# Patient Record
Sex: MALE | Race: WHITE | NOT HISPANIC OR LATINO | Employment: FULL TIME | RURAL
[De-identification: names, ages, dates, MRNs, and addresses within clinical notes are randomized per-mention and may not be internally consistent; named-entity substitution may affect disease eponyms.]

---

## 2020-11-05 ENCOUNTER — HISTORICAL (OUTPATIENT)
Dept: ADMINISTRATIVE | Facility: HOSPITAL | Age: 40
End: 2020-11-05

## 2020-11-05 LAB — SARS-COV+SARS-COV-2 AG RESP QL IA.RAPID: NEGATIVE

## 2021-06-03 ENCOUNTER — OFFICE VISIT (OUTPATIENT)
Dept: FAMILY MEDICINE | Facility: CLINIC | Age: 41
End: 2021-06-03
Payer: COMMERCIAL

## 2021-06-03 VITALS
OXYGEN SATURATION: 97 % | TEMPERATURE: 98 F | RESPIRATION RATE: 18 BRPM | HEIGHT: 76 IN | DIASTOLIC BLOOD PRESSURE: 86 MMHG | SYSTOLIC BLOOD PRESSURE: 132 MMHG | HEART RATE: 92 BPM | BODY MASS INDEX: 35.68 KG/M2 | WEIGHT: 293 LBS

## 2021-06-03 DIAGNOSIS — I10 HYPERTENSION, UNSPECIFIED TYPE: ICD-10-CM

## 2021-06-03 DIAGNOSIS — R53.83 FATIGUE, UNSPECIFIED TYPE: ICD-10-CM

## 2021-06-03 DIAGNOSIS — M25.572 SINUS TARSI SYNDROME OF LEFT ANKLE: ICD-10-CM

## 2021-06-03 DIAGNOSIS — G62.9 POLYNEUROPATHY: Primary | ICD-10-CM

## 2021-06-03 LAB
ANION GAP SERPL CALCULATED.3IONS-SCNC: 10 MMOL/L (ref 7–16)
BASOPHILS # BLD AUTO: 0.07 K/UL (ref 0–0.2)
BASOPHILS NFR BLD AUTO: 0.8 % (ref 0–1)
BUN SERPL-MCNC: 16 MG/DL (ref 7–18)
BUN/CREAT SERPL: 18 (ref 6–20)
CALCIUM SERPL-MCNC: 9.4 MG/DL (ref 8.5–10.1)
CHLORIDE SERPL-SCNC: 105 MMOL/L (ref 98–107)
CO2 SERPL-SCNC: 28 MMOL/L (ref 21–32)
CREAT SERPL-MCNC: 0.91 MG/DL (ref 0.7–1.3)
DIFFERENTIAL METHOD BLD: ABNORMAL
EOSINOPHIL # BLD AUTO: 0.34 K/UL (ref 0–0.5)
EOSINOPHIL NFR BLD AUTO: 3.7 % (ref 1–4)
ERYTHROCYTE [DISTWIDTH] IN BLOOD BY AUTOMATED COUNT: 12.2 % (ref 11.5–14.5)
EST. AVERAGE GLUCOSE BLD GHB EST-MCNC: 87 MG/DL
GLUCOSE SERPL-MCNC: 88 MG/DL (ref 74–106)
HBA1C MFR BLD HPLC: 5.2 % (ref 4.5–6.6)
HCT VFR BLD AUTO: 49.7 % (ref 40–54)
HGB BLD-MCNC: 16.5 G/DL (ref 13.5–18)
IMM GRANULOCYTES # BLD AUTO: 0.03 K/UL (ref 0–0.04)
IMM GRANULOCYTES NFR BLD: 0.3 % (ref 0–0.4)
LYMPHOCYTES # BLD AUTO: 3.72 K/UL (ref 1–4.8)
LYMPHOCYTES NFR BLD AUTO: 40 % (ref 27–41)
MCH RBC QN AUTO: 31.4 PG (ref 27–31)
MCHC RBC AUTO-ENTMCNC: 33.2 G/DL (ref 32–36)
MCV RBC AUTO: 94.5 FL (ref 80–96)
MONOCYTES # BLD AUTO: 0.72 K/UL (ref 0–0.8)
MONOCYTES NFR BLD AUTO: 7.8 % (ref 2–6)
MPC BLD CALC-MCNC: 10.5 FL (ref 9.4–12.4)
NEUTROPHILS # BLD AUTO: 4.41 K/UL (ref 1.8–7.7)
NEUTROPHILS NFR BLD AUTO: 47.4 % (ref 53–65)
NRBC # BLD AUTO: 0 X10E3/UL
NRBC, AUTO (.00): 0 %
PLATELET # BLD AUTO: 356 K/UL (ref 150–400)
POTASSIUM SERPL-SCNC: 4.5 MMOL/L (ref 3.5–5.1)
RBC # BLD AUTO: 5.26 M/UL (ref 4.6–6.2)
SODIUM SERPL-SCNC: 138 MMOL/L (ref 136–145)
T3 SERPL-MCNC: 174 NG/DL (ref 60–181)
T4 SERPL-MCNC: 9.5 ΜG/DL (ref 4.5–12.1)
TSH SERPL DL<=0.005 MIU/L-ACNC: 0.93 UIU/ML (ref 0.36–3.74)
WBC # BLD AUTO: 9.29 K/UL (ref 4.5–11)

## 2021-06-03 PROCEDURE — 3008F PR BODY MASS INDEX (BMI) DOCUMENTED: ICD-10-PCS | Mod: ,,, | Performed by: NURSE PRACTITIONER

## 2021-06-03 PROCEDURE — 1125F AMNT PAIN NOTED PAIN PRSNT: CPT | Mod: ,,, | Performed by: NURSE PRACTITIONER

## 2021-06-03 PROCEDURE — 80048 BASIC METABOLIC PANEL: ICD-10-PCS | Mod: ,,, | Performed by: CLINICAL MEDICAL LABORATORY

## 2021-06-03 PROCEDURE — 84480 ASSAY TRIIODOTHYRONINE (T3): CPT | Mod: ,,, | Performed by: CLINICAL MEDICAL LABORATORY

## 2021-06-03 PROCEDURE — 85025 COMPLETE CBC W/AUTO DIFF WBC: CPT | Mod: ,,, | Performed by: CLINICAL MEDICAL LABORATORY

## 2021-06-03 PROCEDURE — 84443 TSH: ICD-10-PCS | Mod: ,,, | Performed by: CLINICAL MEDICAL LABORATORY

## 2021-06-03 PROCEDURE — 1125F PR PAIN SEVERITY QUANTIFIED, PAIN PRESENT: ICD-10-PCS | Mod: ,,, | Performed by: NURSE PRACTITIONER

## 2021-06-03 PROCEDURE — 3008F BODY MASS INDEX DOCD: CPT | Mod: ,,, | Performed by: NURSE PRACTITIONER

## 2021-06-03 PROCEDURE — 84436 ASSAY OF TOTAL THYROXINE: CPT | Mod: ,,, | Performed by: CLINICAL MEDICAL LABORATORY

## 2021-06-03 PROCEDURE — 84443 ASSAY THYROID STIM HORMONE: CPT | Mod: ,,, | Performed by: CLINICAL MEDICAL LABORATORY

## 2021-06-03 PROCEDURE — 84480 T3: ICD-10-PCS | Mod: ,,, | Performed by: CLINICAL MEDICAL LABORATORY

## 2021-06-03 PROCEDURE — 85025 CBC WITH DIFFERENTIAL: ICD-10-PCS | Mod: ,,, | Performed by: CLINICAL MEDICAL LABORATORY

## 2021-06-03 PROCEDURE — 80048 BASIC METABOLIC PNL TOTAL CA: CPT | Mod: ,,, | Performed by: CLINICAL MEDICAL LABORATORY

## 2021-06-03 PROCEDURE — 83036 HEMOGLOBIN GLYCOSYLATED A1C: CPT | Mod: ,,, | Performed by: CLINICAL MEDICAL LABORATORY

## 2021-06-03 PROCEDURE — 83036 HEMOGLOBIN A1C: ICD-10-PCS | Mod: ,,, | Performed by: CLINICAL MEDICAL LABORATORY

## 2021-06-03 PROCEDURE — 99214 OFFICE O/P EST MOD 30 MIN: CPT | Mod: ,,, | Performed by: NURSE PRACTITIONER

## 2021-06-03 PROCEDURE — 99214 PR OFFICE/OUTPT VISIT, EST, LEVL IV, 30-39 MIN: ICD-10-PCS | Mod: ,,, | Performed by: NURSE PRACTITIONER

## 2021-06-03 PROCEDURE — 84436 T4: ICD-10-PCS | Mod: ,,, | Performed by: CLINICAL MEDICAL LABORATORY

## 2021-06-03 RX ORDER — OMEPRAZOLE 40 MG/1
40 CAPSULE, DELAYED RELEASE ORAL DAILY
COMMUNITY
End: 2021-07-22 | Stop reason: SDUPTHER

## 2021-06-03 RX ORDER — LISINOPRIL 10 MG/1
1 TABLET ORAL DAILY
COMMUNITY
Start: 2021-04-30 | End: 2021-07-22 | Stop reason: SDUPTHER

## 2021-06-03 RX ORDER — GABAPENTIN 100 MG/1
100 CAPSULE ORAL 3 TIMES DAILY
Qty: 90 CAPSULE | Refills: 2 | Status: SHIPPED | OUTPATIENT
Start: 2021-06-03 | End: 2021-06-23 | Stop reason: DRUGHIGH

## 2021-06-03 RX ORDER — HYDROCODONE BITARTRATE AND ACETAMINOPHEN 7.5; 325 MG/1; MG/1
1 TABLET ORAL 2 TIMES DAILY PRN
COMMUNITY
Start: 2021-06-01

## 2021-06-03 RX ORDER — AMLODIPINE BESYLATE 5 MG/1
1 TABLET ORAL DAILY
COMMUNITY
Start: 2021-03-20 | End: 2021-06-14

## 2021-06-23 DIAGNOSIS — G62.9 POLYNEUROPATHY: Primary | ICD-10-CM

## 2021-06-23 RX ORDER — GABAPENTIN 300 MG/1
300 CAPSULE ORAL 2 TIMES DAILY
Qty: 60 CAPSULE | Refills: 0 | Status: SHIPPED | OUTPATIENT
Start: 2021-06-23 | End: 2021-07-22 | Stop reason: SDUPTHER

## 2021-07-22 ENCOUNTER — OFFICE VISIT (OUTPATIENT)
Dept: FAMILY MEDICINE | Facility: CLINIC | Age: 41
End: 2021-07-22
Payer: COMMERCIAL

## 2021-07-22 VITALS
OXYGEN SATURATION: 98 % | RESPIRATION RATE: 18 BRPM | DIASTOLIC BLOOD PRESSURE: 82 MMHG | SYSTOLIC BLOOD PRESSURE: 135 MMHG | TEMPERATURE: 99 F | HEIGHT: 75 IN | HEART RATE: 90 BPM | WEIGHT: 285 LBS | BODY MASS INDEX: 35.43 KG/M2

## 2021-07-22 DIAGNOSIS — G62.9 POLYNEUROPATHY: ICD-10-CM

## 2021-07-22 DIAGNOSIS — Z13.220 SCREENING, LIPID: ICD-10-CM

## 2021-07-22 DIAGNOSIS — K21.9 GASTROESOPHAGEAL REFLUX DISEASE, UNSPECIFIED WHETHER ESOPHAGITIS PRESENT: ICD-10-CM

## 2021-07-22 DIAGNOSIS — Z00.00 WELLNESS EXAMINATION: Primary | ICD-10-CM

## 2021-07-22 DIAGNOSIS — I10 HYPERTENSION, UNSPECIFIED TYPE: ICD-10-CM

## 2021-07-22 DIAGNOSIS — Z13.1 SCREENING FOR DIABETES MELLITUS: ICD-10-CM

## 2021-07-22 LAB — GLUCOSE SERPL-MCNC: 83 MG/DL (ref 74–106)

## 2021-07-22 PROCEDURE — 1125F AMNT PAIN NOTED PAIN PRSNT: CPT | Mod: ,,, | Performed by: NURSE PRACTITIONER

## 2021-07-22 PROCEDURE — 3008F BODY MASS INDEX DOCD: CPT | Mod: ,,, | Performed by: NURSE PRACTITIONER

## 2021-07-22 PROCEDURE — 82947 GLUCOSE, FASTING: ICD-10-PCS | Mod: ,,, | Performed by: CLINICAL MEDICAL LABORATORY

## 2021-07-22 PROCEDURE — 3008F PR BODY MASS INDEX (BMI) DOCUMENTED: ICD-10-PCS | Mod: ,,, | Performed by: NURSE PRACTITIONER

## 2021-07-22 PROCEDURE — 1125F PR PAIN SEVERITY QUANTIFIED, PAIN PRESENT: ICD-10-PCS | Mod: ,,, | Performed by: NURSE PRACTITIONER

## 2021-07-22 PROCEDURE — 82947 ASSAY GLUCOSE BLOOD QUANT: CPT | Mod: ,,, | Performed by: CLINICAL MEDICAL LABORATORY

## 2021-07-22 PROCEDURE — 99396 PREV VISIT EST AGE 40-64: CPT | Mod: ,,, | Performed by: NURSE PRACTITIONER

## 2021-07-22 PROCEDURE — 99396 PR PREVENTIVE VISIT,EST,40-64: ICD-10-PCS | Mod: ,,, | Performed by: NURSE PRACTITIONER

## 2021-07-22 RX ORDER — OMEPRAZOLE 40 MG/1
40 CAPSULE, DELAYED RELEASE ORAL DAILY
Qty: 90 CAPSULE | Refills: 1 | Status: SHIPPED | OUTPATIENT
Start: 2021-07-22 | End: 2021-11-04 | Stop reason: SDUPTHER

## 2021-07-22 RX ORDER — GABAPENTIN 300 MG/1
300 CAPSULE ORAL 2 TIMES DAILY
Qty: 180 CAPSULE | Refills: 1 | Status: SHIPPED | OUTPATIENT
Start: 2021-07-22 | End: 2021-11-04 | Stop reason: DRUGHIGH

## 2021-07-22 RX ORDER — LISINOPRIL 10 MG/1
10 TABLET ORAL DAILY
Qty: 90 TABLET | Refills: 1 | Status: SHIPPED | OUTPATIENT
Start: 2021-07-22 | End: 2021-11-04 | Stop reason: SDUPTHER

## 2021-07-22 RX ORDER — AMLODIPINE BESYLATE 5 MG/1
5 TABLET ORAL DAILY
Qty: 90 TABLET | Refills: 1 | Status: SHIPPED | OUTPATIENT
Start: 2021-07-22 | End: 2021-11-04 | Stop reason: SDUPTHER

## 2021-07-27 LAB
CHOLEST SERPL-MCNC: 177 MG/DL (ref 0–200)
CHOLEST/HDLC SERPL: 3.8 {RATIO}
HDLC SERPL-MCNC: 46 MG/DL (ref 40–60)
LDLC SERPL CALC-MCNC: 115 MG/DL
LDLC/HDLC SERPL: 2.5 {RATIO}
NONHDLC SERPL-MCNC: 131 MG/DL
TRIGL SERPL-MCNC: 80 MG/DL (ref 35–150)
VLDLC SERPL-MCNC: 16 MG/DL

## 2021-07-27 PROCEDURE — 80061 LIPID PANEL: ICD-10-PCS | Mod: ,,, | Performed by: CLINICAL MEDICAL LABORATORY

## 2021-07-27 PROCEDURE — 80061 LIPID PANEL: CPT | Mod: ,,, | Performed by: CLINICAL MEDICAL LABORATORY

## 2021-08-18 ENCOUNTER — OFFICE VISIT (OUTPATIENT)
Dept: FAMILY MEDICINE | Facility: CLINIC | Age: 41
End: 2021-08-18
Payer: COMMERCIAL

## 2021-08-18 VITALS
HEART RATE: 76 BPM | TEMPERATURE: 99 F | OXYGEN SATURATION: 93 % | RESPIRATION RATE: 18 BRPM | DIASTOLIC BLOOD PRESSURE: 79 MMHG | SYSTOLIC BLOOD PRESSURE: 136 MMHG

## 2021-08-18 DIAGNOSIS — Z20.822 COVID-19 RULED OUT: ICD-10-CM

## 2021-08-18 DIAGNOSIS — I10 HYPERTENSION, UNSPECIFIED TYPE: ICD-10-CM

## 2021-08-18 DIAGNOSIS — R42 DIZZY SPELLS: ICD-10-CM

## 2021-08-18 DIAGNOSIS — E66.9 CLASS 2 OBESITY WITH BODY MASS INDEX (BMI) OF 35.0 TO 35.9 IN ADULT, UNSPECIFIED OBESITY TYPE, UNSPECIFIED WHETHER SERIOUS COMORBIDITY PRESENT: ICD-10-CM

## 2021-08-18 DIAGNOSIS — R53.83 FATIGUE, UNSPECIFIED TYPE: ICD-10-CM

## 2021-08-18 DIAGNOSIS — R42 DIZZINESS: Primary | ICD-10-CM

## 2021-08-18 LAB
ALBUMIN SERPL BCP-MCNC: 3.9 G/DL (ref 3.5–5)
ALBUMIN/GLOB SERPL: 1.3 {RATIO}
ALP SERPL-CCNC: 108 U/L (ref 45–115)
ALT SERPL W P-5'-P-CCNC: 36 U/L (ref 16–61)
ANION GAP SERPL CALCULATED.3IONS-SCNC: 13 MMOL/L (ref 7–16)
AST SERPL W P-5'-P-CCNC: 17 U/L (ref 15–37)
BASOPHILS # BLD AUTO: 0.06 K/UL (ref 0–0.2)
BASOPHILS NFR BLD AUTO: 0.7 % (ref 0–1)
BILIRUB SERPL-MCNC: 0.3 MG/DL (ref 0–1.2)
BUN SERPL-MCNC: 15 MG/DL (ref 7–18)
BUN/CREAT SERPL: 16 (ref 6–20)
CALCIUM SERPL-MCNC: 8.8 MG/DL (ref 8.5–10.1)
CHLORIDE SERPL-SCNC: 110 MMOL/L (ref 98–107)
CO2 SERPL-SCNC: 24 MMOL/L (ref 21–32)
CREAT SERPL-MCNC: 0.92 MG/DL (ref 0.7–1.3)
CTP QC/QA: YES
DIFFERENTIAL METHOD BLD: ABNORMAL
EKG 12-LEAD: NORMAL
EOSINOPHIL # BLD AUTO: 0.29 K/UL (ref 0–0.5)
EOSINOPHIL NFR BLD AUTO: 3.3 % (ref 1–4)
ERYTHROCYTE [DISTWIDTH] IN BLOOD BY AUTOMATED COUNT: 12.7 % (ref 11.5–14.5)
GLOBULIN SER-MCNC: 3 G/DL (ref 2–4)
GLUCOSE SERPL-MCNC: 71 MG/DL (ref 74–106)
HCT VFR BLD AUTO: 47.7 % (ref 40–54)
HGB BLD-MCNC: 16.2 G/DL (ref 13.5–18)
IMM GRANULOCYTES # BLD AUTO: 0.02 K/UL (ref 0–0.04)
IMM GRANULOCYTES NFR BLD: 0.2 % (ref 0–0.4)
LYMPHOCYTES # BLD AUTO: 2.6 K/UL (ref 1–4.8)
LYMPHOCYTES NFR BLD AUTO: 30 % (ref 27–41)
MCH RBC QN AUTO: 32.2 PG (ref 27–31)
MCHC RBC AUTO-ENTMCNC: 34 G/DL (ref 32–36)
MCV RBC AUTO: 94.8 FL (ref 80–96)
MONOCYTES # BLD AUTO: 0.68 K/UL (ref 0–0.8)
MONOCYTES NFR BLD AUTO: 7.8 % (ref 2–6)
MPC BLD CALC-MCNC: 10.5 FL (ref 9.4–12.4)
NEUTROPHILS # BLD AUTO: 5.02 K/UL (ref 1.8–7.7)
NEUTROPHILS NFR BLD AUTO: 58 % (ref 53–65)
NRBC # BLD AUTO: 0 X10E3/UL
NRBC, AUTO (.00): 0 %
PLATELET # BLD AUTO: 342 K/UL (ref 150–400)
POTASSIUM SERPL-SCNC: 3.8 MMOL/L (ref 3.5–5.1)
PR INTERVAL: NORMAL
PROT SERPL-MCNC: 6.9 G/DL (ref 6.4–8.2)
PRT AXES: NORMAL
QRS DURATION: NORMAL
QT/QTC: NORMAL
RBC # BLD AUTO: 5.03 M/UL (ref 4.6–6.2)
SARS-COV-2 AG RESP QL IA.RAPID: NEGATIVE
SODIUM SERPL-SCNC: 143 MMOL/L (ref 136–145)
VENTRICULAR RATE: NORMAL
WBC # BLD AUTO: 8.67 K/UL (ref 4.5–11)

## 2021-08-18 PROCEDURE — 3078F PR MOST RECENT DIASTOLIC BLOOD PRESSURE < 80 MM HG: ICD-10-PCS | Mod: ,,, | Performed by: NURSE PRACTITIONER

## 2021-08-18 PROCEDURE — 1160F RVW MEDS BY RX/DR IN RCRD: CPT | Mod: ,,, | Performed by: NURSE PRACTITIONER

## 2021-08-18 PROCEDURE — 80053 COMPREHENSIVE METABOLIC PANEL: ICD-10-PCS | Mod: ,,, | Performed by: CLINICAL MEDICAL LABORATORY

## 2021-08-18 PROCEDURE — 85025 CBC WITH DIFFERENTIAL: ICD-10-PCS | Mod: ,,, | Performed by: CLINICAL MEDICAL LABORATORY

## 2021-08-18 PROCEDURE — 3078F DIAST BP <80 MM HG: CPT | Mod: ,,, | Performed by: NURSE PRACTITIONER

## 2021-08-18 PROCEDURE — 99213 PR OFFICE/OUTPT VISIT, EST, LEVL III, 20-29 MIN: ICD-10-PCS | Mod: ,,, | Performed by: NURSE PRACTITIONER

## 2021-08-18 PROCEDURE — 87426 SARS CORONAVIRUS 2 ANTIGEN POCT: ICD-10-PCS | Mod: QW,,, | Performed by: NURSE PRACTITIONER

## 2021-08-18 PROCEDURE — 1160F PR REVIEW ALL MEDS BY PRESCRIBER/CLIN PHARMACIST DOCUMENTED: ICD-10-PCS | Mod: ,,, | Performed by: NURSE PRACTITIONER

## 2021-08-18 PROCEDURE — 1159F MED LIST DOCD IN RCRD: CPT | Mod: ,,, | Performed by: NURSE PRACTITIONER

## 2021-08-18 PROCEDURE — 3075F SYST BP GE 130 - 139MM HG: CPT | Mod: ,,, | Performed by: NURSE PRACTITIONER

## 2021-08-18 PROCEDURE — 85025 COMPLETE CBC W/AUTO DIFF WBC: CPT | Mod: ,,, | Performed by: CLINICAL MEDICAL LABORATORY

## 2021-08-18 PROCEDURE — 93000 POCT EKG 12-LEAD: ICD-10-PCS | Mod: ,,, | Performed by: NURSE PRACTITIONER

## 2021-08-18 PROCEDURE — 80053 COMPREHEN METABOLIC PANEL: CPT | Mod: ,,, | Performed by: CLINICAL MEDICAL LABORATORY

## 2021-08-18 PROCEDURE — 3044F PR MOST RECENT HEMOGLOBIN A1C LEVEL <7.0%: ICD-10-PCS | Mod: ,,, | Performed by: NURSE PRACTITIONER

## 2021-08-18 PROCEDURE — 3075F PR MOST RECENT SYSTOLIC BLOOD PRESS GE 130-139MM HG: ICD-10-PCS | Mod: ,,, | Performed by: NURSE PRACTITIONER

## 2021-08-18 PROCEDURE — 99213 OFFICE O/P EST LOW 20 MIN: CPT | Mod: ,,, | Performed by: NURSE PRACTITIONER

## 2021-08-18 PROCEDURE — 1159F PR MEDICATION LIST DOCUMENTED IN MEDICAL RECORD: ICD-10-PCS | Mod: ,,, | Performed by: NURSE PRACTITIONER

## 2021-08-18 PROCEDURE — 3044F HG A1C LEVEL LT 7.0%: CPT | Mod: ,,, | Performed by: NURSE PRACTITIONER

## 2021-08-18 PROCEDURE — 93000 ELECTROCARDIOGRAM COMPLETE: CPT | Mod: ,,, | Performed by: NURSE PRACTITIONER

## 2021-08-18 PROCEDURE — 87426 SARSCOV CORONAVIRUS AG IA: CPT | Mod: QW,,, | Performed by: NURSE PRACTITIONER

## 2021-08-18 RX ORDER — MECLIZINE HCL 12.5 MG 12.5 MG/1
12.5 TABLET ORAL 3 TIMES DAILY PRN
Qty: 30 TABLET | Refills: 0 | Status: SHIPPED | OUTPATIENT
Start: 2021-08-18 | End: 2021-11-04

## 2021-11-04 ENCOUNTER — OFFICE VISIT (OUTPATIENT)
Dept: FAMILY MEDICINE | Facility: CLINIC | Age: 41
End: 2021-11-04
Payer: COMMERCIAL

## 2021-11-04 VITALS
HEIGHT: 75 IN | HEART RATE: 85 BPM | TEMPERATURE: 98 F | RESPIRATION RATE: 18 BRPM | WEIGHT: 282 LBS | SYSTOLIC BLOOD PRESSURE: 138 MMHG | DIASTOLIC BLOOD PRESSURE: 80 MMHG | OXYGEN SATURATION: 97 % | BODY MASS INDEX: 35.06 KG/M2

## 2021-11-04 DIAGNOSIS — G62.9 POLYNEUROPATHY: ICD-10-CM

## 2021-11-04 DIAGNOSIS — K21.9 GASTROESOPHAGEAL REFLUX DISEASE, UNSPECIFIED WHETHER ESOPHAGITIS PRESENT: ICD-10-CM

## 2021-11-04 DIAGNOSIS — I10 HYPERTENSION, UNSPECIFIED TYPE: ICD-10-CM

## 2021-11-04 PROCEDURE — 3044F HG A1C LEVEL LT 7.0%: CPT | Mod: ,,, | Performed by: NURSE PRACTITIONER

## 2021-11-04 PROCEDURE — 3008F BODY MASS INDEX DOCD: CPT | Mod: ,,, | Performed by: NURSE PRACTITIONER

## 2021-11-04 PROCEDURE — 99213 PR OFFICE/OUTPT VISIT, EST, LEVL III, 20-29 MIN: ICD-10-PCS | Mod: ,,, | Performed by: NURSE PRACTITIONER

## 2021-11-04 PROCEDURE — 1160F PR REVIEW ALL MEDS BY PRESCRIBER/CLIN PHARMACIST DOCUMENTED: ICD-10-PCS | Mod: ,,, | Performed by: NURSE PRACTITIONER

## 2021-11-04 PROCEDURE — 3044F PR MOST RECENT HEMOGLOBIN A1C LEVEL <7.0%: ICD-10-PCS | Mod: ,,, | Performed by: NURSE PRACTITIONER

## 2021-11-04 PROCEDURE — 4010F ACE/ARB THERAPY RXD/TAKEN: CPT | Mod: ,,, | Performed by: NURSE PRACTITIONER

## 2021-11-04 PROCEDURE — 3075F SYST BP GE 130 - 139MM HG: CPT | Mod: ,,, | Performed by: NURSE PRACTITIONER

## 2021-11-04 PROCEDURE — 3079F DIAST BP 80-89 MM HG: CPT | Mod: ,,, | Performed by: NURSE PRACTITIONER

## 2021-11-04 PROCEDURE — 3075F PR MOST RECENT SYSTOLIC BLOOD PRESS GE 130-139MM HG: ICD-10-PCS | Mod: ,,, | Performed by: NURSE PRACTITIONER

## 2021-11-04 PROCEDURE — 3079F PR MOST RECENT DIASTOLIC BLOOD PRESSURE 80-89 MM HG: ICD-10-PCS | Mod: ,,, | Performed by: NURSE PRACTITIONER

## 2021-11-04 PROCEDURE — 3008F PR BODY MASS INDEX (BMI) DOCUMENTED: ICD-10-PCS | Mod: ,,, | Performed by: NURSE PRACTITIONER

## 2021-11-04 PROCEDURE — 1159F MED LIST DOCD IN RCRD: CPT | Mod: ,,, | Performed by: NURSE PRACTITIONER

## 2021-11-04 PROCEDURE — 1159F PR MEDICATION LIST DOCUMENTED IN MEDICAL RECORD: ICD-10-PCS | Mod: ,,, | Performed by: NURSE PRACTITIONER

## 2021-11-04 PROCEDURE — 1160F RVW MEDS BY RX/DR IN RCRD: CPT | Mod: ,,, | Performed by: NURSE PRACTITIONER

## 2021-11-04 PROCEDURE — 4010F PR ACE/ARB THEARPY RXD/TAKEN: ICD-10-PCS | Mod: ,,, | Performed by: NURSE PRACTITIONER

## 2021-11-04 PROCEDURE — 99213 OFFICE O/P EST LOW 20 MIN: CPT | Mod: ,,, | Performed by: NURSE PRACTITIONER

## 2021-11-04 RX ORDER — GABAPENTIN 100 MG/1
100 CAPSULE ORAL 2 TIMES DAILY
Qty: 60 CAPSULE | Refills: 0 | Status: SHIPPED | OUTPATIENT
Start: 2021-11-04 | End: 2021-12-20 | Stop reason: SDUPTHER

## 2021-11-04 RX ORDER — OMEPRAZOLE 40 MG/1
40 CAPSULE, DELAYED RELEASE ORAL DAILY
Qty: 90 CAPSULE | Refills: 1 | Status: SHIPPED | OUTPATIENT
Start: 2021-11-04 | End: 2022-02-14 | Stop reason: SDUPTHER

## 2021-11-04 RX ORDER — LISINOPRIL 10 MG/1
10 TABLET ORAL DAILY
Qty: 90 TABLET | Refills: 1 | Status: SHIPPED | OUTPATIENT
Start: 2021-11-04 | End: 2022-02-14 | Stop reason: SDUPTHER

## 2021-11-04 RX ORDER — AMLODIPINE BESYLATE 5 MG/1
5 TABLET ORAL DAILY
Qty: 90 TABLET | Refills: 1 | Status: SHIPPED | OUTPATIENT
Start: 2021-11-04 | End: 2022-02-14

## 2021-11-09 ENCOUNTER — OFFICE VISIT (OUTPATIENT)
Dept: FAMILY MEDICINE | Facility: CLINIC | Age: 41
End: 2021-11-09
Payer: COMMERCIAL

## 2021-11-09 VITALS
HEART RATE: 75 BPM | RESPIRATION RATE: 18 BRPM | TEMPERATURE: 99 F | SYSTOLIC BLOOD PRESSURE: 114 MMHG | DIASTOLIC BLOOD PRESSURE: 70 MMHG | OXYGEN SATURATION: 96 %

## 2021-11-09 DIAGNOSIS — R05.9 COUGH: ICD-10-CM

## 2021-11-09 DIAGNOSIS — R68.83 CHILLS: ICD-10-CM

## 2021-11-09 DIAGNOSIS — J32.0 MAXILLARY SINUSITIS, UNSPECIFIED CHRONICITY: Primary | ICD-10-CM

## 2021-11-09 LAB
CTP QC/QA: YES
CTP QC/QA: YES
FLUAV AG NPH QL: NEGATIVE
FLUBV AG NPH QL: NEGATIVE
S PYO RRNA THROAT QL PROBE: NEGATIVE
SARS-COV-2 AG RESP QL IA.RAPID: NEGATIVE

## 2021-11-09 PROCEDURE — 1160F RVW MEDS BY RX/DR IN RCRD: CPT | Mod: ,,, | Performed by: NURSE PRACTITIONER

## 2021-11-09 PROCEDURE — 3044F PR MOST RECENT HEMOGLOBIN A1C LEVEL <7.0%: ICD-10-PCS | Mod: ,,, | Performed by: NURSE PRACTITIONER

## 2021-11-09 PROCEDURE — 3074F SYST BP LT 130 MM HG: CPT | Mod: ,,, | Performed by: NURSE PRACTITIONER

## 2021-11-09 PROCEDURE — 99214 OFFICE O/P EST MOD 30 MIN: CPT | Mod: 25,,, | Performed by: NURSE PRACTITIONER

## 2021-11-09 PROCEDURE — 87428 POCT SARS-COV2 (COVID) WITH FLU ANTIGEN: ICD-10-PCS | Mod: QW,,, | Performed by: NURSE PRACTITIONER

## 2021-11-09 PROCEDURE — 87880 STREP A ASSAY W/OPTIC: CPT | Mod: QW,,, | Performed by: NURSE PRACTITIONER

## 2021-11-09 PROCEDURE — 3074F PR MOST RECENT SYSTOLIC BLOOD PRESSURE < 130 MM HG: ICD-10-PCS | Mod: ,,, | Performed by: NURSE PRACTITIONER

## 2021-11-09 PROCEDURE — 1159F PR MEDICATION LIST DOCUMENTED IN MEDICAL RECORD: ICD-10-PCS | Mod: ,,, | Performed by: NURSE PRACTITIONER

## 2021-11-09 PROCEDURE — 1159F MED LIST DOCD IN RCRD: CPT | Mod: ,,, | Performed by: NURSE PRACTITIONER

## 2021-11-09 PROCEDURE — 96372 PR INJECTION,THERAP/PROPH/DIAG2ST, IM OR SUBCUT: ICD-10-PCS | Mod: ,,, | Performed by: NURSE PRACTITIONER

## 2021-11-09 PROCEDURE — 4010F PR ACE/ARB THEARPY RXD/TAKEN: ICD-10-PCS | Mod: ,,, | Performed by: NURSE PRACTITIONER

## 2021-11-09 PROCEDURE — 87880 POCT RAPID STREP A: ICD-10-PCS | Mod: QW,,, | Performed by: NURSE PRACTITIONER

## 2021-11-09 PROCEDURE — 1160F PR REVIEW ALL MEDS BY PRESCRIBER/CLIN PHARMACIST DOCUMENTED: ICD-10-PCS | Mod: ,,, | Performed by: NURSE PRACTITIONER

## 2021-11-09 PROCEDURE — 99214 PR OFFICE/OUTPT VISIT, EST, LEVL IV, 30-39 MIN: ICD-10-PCS | Mod: 25,,, | Performed by: NURSE PRACTITIONER

## 2021-11-09 PROCEDURE — 4010F ACE/ARB THERAPY RXD/TAKEN: CPT | Mod: ,,, | Performed by: NURSE PRACTITIONER

## 2021-11-09 PROCEDURE — 96372 THER/PROPH/DIAG INJ SC/IM: CPT | Mod: ,,, | Performed by: NURSE PRACTITIONER

## 2021-11-09 PROCEDURE — 3078F DIAST BP <80 MM HG: CPT | Mod: ,,, | Performed by: NURSE PRACTITIONER

## 2021-11-09 PROCEDURE — 3044F HG A1C LEVEL LT 7.0%: CPT | Mod: ,,, | Performed by: NURSE PRACTITIONER

## 2021-11-09 PROCEDURE — 3078F PR MOST RECENT DIASTOLIC BLOOD PRESSURE < 80 MM HG: ICD-10-PCS | Mod: ,,, | Performed by: NURSE PRACTITIONER

## 2021-11-09 PROCEDURE — 87428 SARSCOV & INF VIR A&B AG IA: CPT | Mod: QW,,, | Performed by: NURSE PRACTITIONER

## 2021-11-09 RX ORDER — FLUTICASONE PROPIONATE 50 MCG
1 SPRAY, SUSPENSION (ML) NASAL DAILY
Qty: 16 G | Refills: 0 | Status: SHIPPED | OUTPATIENT
Start: 2021-11-09 | End: 2022-02-14 | Stop reason: HOSPADM

## 2021-11-09 RX ORDER — DEXAMETHASONE SODIUM PHOSPHATE 4 MG/ML
4 INJECTION, SOLUTION INTRA-ARTICULAR; INTRALESIONAL; INTRAMUSCULAR; INTRAVENOUS; SOFT TISSUE
Status: COMPLETED | OUTPATIENT
Start: 2021-11-09 | End: 2021-11-09

## 2021-11-09 RX ORDER — CEFTRIAXONE 1 G/1
1 INJECTION, POWDER, FOR SOLUTION INTRAMUSCULAR; INTRAVENOUS
Status: COMPLETED | OUTPATIENT
Start: 2021-11-09 | End: 2021-11-09

## 2021-11-09 RX ORDER — AZITHROMYCIN 250 MG/1
TABLET, FILM COATED ORAL
Qty: 6 TABLET | Refills: 0 | Status: SHIPPED | OUTPATIENT
Start: 2021-11-09 | End: 2021-11-14

## 2021-11-09 RX ADMIN — CEFTRIAXONE 1 G: 1 INJECTION, POWDER, FOR SOLUTION INTRAMUSCULAR; INTRAVENOUS at 10:11

## 2021-11-09 RX ADMIN — DEXAMETHASONE SODIUM PHOSPHATE 4 MG: 4 INJECTION, SOLUTION INTRA-ARTICULAR; INTRALESIONAL; INTRAMUSCULAR; INTRAVENOUS; SOFT TISSUE at 10:11

## 2021-12-20 DIAGNOSIS — G62.9 POLYNEUROPATHY: ICD-10-CM

## 2021-12-20 RX ORDER — GABAPENTIN 100 MG/1
100 CAPSULE ORAL 2 TIMES DAILY
Qty: 180 CAPSULE | Refills: 1 | Status: SHIPPED | OUTPATIENT
Start: 2021-12-20 | End: 2022-02-14 | Stop reason: ALTCHOICE

## 2022-01-07 ENCOUNTER — OFFICE VISIT (OUTPATIENT)
Dept: FAMILY MEDICINE | Facility: CLINIC | Age: 42
End: 2022-01-07
Payer: COMMERCIAL

## 2022-01-07 VITALS
HEART RATE: 65 BPM | RESPIRATION RATE: 20 BRPM | OXYGEN SATURATION: 98 % | SYSTOLIC BLOOD PRESSURE: 134 MMHG | TEMPERATURE: 98 F | DIASTOLIC BLOOD PRESSURE: 80 MMHG

## 2022-01-07 DIAGNOSIS — J32.9 SINUSITIS, UNSPECIFIED CHRONICITY, UNSPECIFIED LOCATION: Primary | ICD-10-CM

## 2022-01-07 PROCEDURE — 99213 OFFICE O/P EST LOW 20 MIN: CPT | Mod: ,,, | Performed by: FAMILY MEDICINE

## 2022-01-07 PROCEDURE — 3075F SYST BP GE 130 - 139MM HG: CPT | Mod: ,,, | Performed by: FAMILY MEDICINE

## 2022-01-07 PROCEDURE — 3079F PR MOST RECENT DIASTOLIC BLOOD PRESSURE 80-89 MM HG: ICD-10-PCS | Mod: ,,, | Performed by: FAMILY MEDICINE

## 2022-01-07 PROCEDURE — 3075F PR MOST RECENT SYSTOLIC BLOOD PRESS GE 130-139MM HG: ICD-10-PCS | Mod: ,,, | Performed by: FAMILY MEDICINE

## 2022-01-07 PROCEDURE — 99213 PR OFFICE/OUTPT VISIT, EST, LEVL III, 20-29 MIN: ICD-10-PCS | Mod: ,,, | Performed by: FAMILY MEDICINE

## 2022-01-07 PROCEDURE — 3079F DIAST BP 80-89 MM HG: CPT | Mod: ,,, | Performed by: FAMILY MEDICINE

## 2022-01-07 PROCEDURE — 1159F PR MEDICATION LIST DOCUMENTED IN MEDICAL RECORD: ICD-10-PCS | Mod: ,,, | Performed by: FAMILY MEDICINE

## 2022-01-07 PROCEDURE — 1159F MED LIST DOCD IN RCRD: CPT | Mod: ,,, | Performed by: FAMILY MEDICINE

## 2022-01-07 RX ORDER — AMOXICILLIN AND CLAVULANATE POTASSIUM 875; 125 MG/1; MG/1
1 TABLET, FILM COATED ORAL EVERY 12 HOURS
Qty: 14 TABLET | Refills: 0 | Status: SHIPPED | OUTPATIENT
Start: 2022-01-07 | End: 2022-01-14

## 2022-01-07 RX ORDER — PHENYLEPHRINE HCL 10 MG/1
10 TABLET, FILM COATED ORAL EVERY 6 HOURS PRN
Qty: 16 TABLET | Refills: 0 | Status: SHIPPED | OUTPATIENT
Start: 2022-01-07 | End: 2022-01-11

## 2022-01-07 RX ORDER — IPRATROPIUM BROMIDE 42 UG/1
2 SPRAY, METERED NASAL 3 TIMES DAILY
Qty: 15 ML | Refills: 1 | Status: SHIPPED | OUTPATIENT
Start: 2022-01-07 | End: 2022-02-14 | Stop reason: ALTCHOICE

## 2022-01-07 NOTE — LETTER
January 7, 2022      [facility] [address] [place] [address]  Phone: [phone]  Fax: [phone]       Patient: Candelario Starks   YOB: 1980  Date of Visit: 01/07/2022    To Whom It May Concern:    Melissa Starks  was at [facility] on 01/07/2022. The patient may return to work/school on 01/10/2022 with no restrictions. If you have any questions or concerns, or if I can be of further assistance, please do not hesitate to contact me.    Sincerely,      Dr. Alex Mariee / Kayy Marcial RN

## 2022-01-07 NOTE — PROGRESS NOTES
Alex Mariee DO   [facility]  [address]  [place]     PATIENT NAME: Candelario Starks  : 1980  DATE: 22  MRN: [id number]      Billing Provider: Alex Mariee DO  Level of Service:   Patient PCP Information     Provider PCP Type    [facility]          Reason for Visit / Chief Complaint: Cough, Sinus Problem, and Headache (Pt states he tested positive for covid 5 days ago at [facility].  C/o sinus pain pressure, congestion, fever, headache, pain behind eyes, burning in chest.  States was suppose to go back to work  today.  )       Update PCP  Update Chief Complaint         History of Present Illness / Problem Focused Workflow     Candelario Starks presents to the clinic for nasal congestion headache and intermittent dizziness that has been ongoing since Monday.  Patient tested positive on  for COVID-19.  Patient states that symptoms of COVID cough fever and body aches began the day prior on Saturday but resolved by Monday.  Since then he has been experiencing more sinus type pain.  No other complaints.  No over-the-counter medication.  NKDA.    Review of Systems     Review of Systems   Constitutional: Negative.    Eyes: Negative.    Respiratory: Negative.    Cardiovascular: Negative.    Gastrointestinal: Negative.         Medical / Social / Family History     Past Medical History:   Diagnosis Date    Ankle joint pain     Depressive disorder     GERD (gastroesophageal reflux disease)     Hypertension     Sinus tarsi syndrome of left ankle     Vitamin D deficiency        Past Surgical History:   Procedure Laterality Date    ADENOIDECTOMY      TONSILLECTOMY         Social History    reports that he has been smoking. His smokeless tobacco use includes snuff. He reports previous alcohol use. He reports that he does not use drugs.    Family History  's family history includes Cancer in his father; Coronary artery disease in his father  and mother; Hypertension in his father and mother; Stroke in his father.    Medications and Allergies     Medications  Outpatient Medications Marked as Taking for the 1/7/22 encounter (Office Visit) with Alex Mariee, DO   Medication Sig Dispense Refill    amLODIPine (NORVASC) 5 MG tablet Take 1 tablet (5 mg total) by mouth once daily. 90 tablet 1    fluticasone propionate (FLONASE) 50 mcg/actuation nasal spray 1 spray (50 mcg total) by Each Nostril route once daily. 16 g 0    gabapentin (NEURONTIN) 100 MG capsule Take 1 capsule (100 mg total) by mouth 2 (two) times daily. 180 capsule 1    HYDROcodone-acetaminophen (NORCO) 7.5-325 mg per tablet Take 1 tablet by mouth 2 (two) times daily as needed.      lisinopriL 10 MG tablet Take 1 tablet (10 mg total) by mouth once daily. 90 tablet 1    omeprazole (PRILOSEC) 40 MG capsule Take 1 capsule (40 mg total) by mouth once daily. 90 capsule 1    pyrilam-chlophed-acetaminophen 12.5-12.5-160 mg/5 mL Liqd Take 10 mLs by mouth every 8 (eight) hours as needed (COUGH). 200 mL 0       Allergies  Review of patient's allergies indicates:  No Known Allergies    Physical Examination     Vitals:    01/07/22 0828   BP: 134/80   BP Location: Right arm   Patient Position: Sitting   BP Method: Large (Manual)   Pulse: 65   Resp: 20   Temp: 98.3 °F (36.8 °C)   TempSrc: Oral   SpO2: 98%      Physical Exam  Vitals and nursing note reviewed.   Constitutional:       General: He is not in acute distress.     Appearance: Normal appearance. He is normal weight. He is not ill-appearing, toxic-appearing or diaphoretic.   Neck:      Vascular: No carotid bruit.   Cardiovascular:      Rate and Rhythm: Normal rate and regular rhythm.      Pulses: Normal pulses.      Heart sounds: Normal heart sounds. No murmur heard.  No friction rub. No gallop.    Pulmonary:      Effort: Pulmonary effort is normal. No respiratory distress.      Breath sounds: Normal breath sounds. No stridor. No wheezing,  rhonchi or rales.   Chest:      Chest wall: No tenderness.   Lymphadenopathy:      Cervical: No cervical adenopathy.   Neurological:      Mental Status: He is alert.          Assessment and Plan (including Health Maintenance)      Problem List  Smart Sets  Document Outside HM   :    Plan:   Augmentin  Atrovent  Phenylephrine  Return to clinic 1 week no improvement  Patient declined a work excuse.      Health Maintenance Due   Topic Date Due    Hepatitis C Screening  Never done    COVID-19 Vaccine (1) Never done    Pneumococcal Vaccines (Age 0-64) (1 of 2 - PPSV23) Never done    HIV Screening  Never done    TETANUS VACCINE  Never done    Influenza Vaccine (1) Never done       Problem List Items Addressed This Visit    None     Visit Diagnoses     Sinusitis, unspecified chronicity, unspecified location    -  Primary    Relevant Medications    amoxicillin-clavulanate 875-125mg (AUGMENTIN) 875-125 mg per tablet    ipratropium (ATROVENT) 42 mcg (0.06 %) nasal spray    phenylephrine (SUDAFED PE) 10 MG Tab        Sinusitis, unspecified chronicity, unspecified location  -     amoxicillin-clavulanate 875-125mg (AUGMENTIN) 875-125 mg per tablet; Take 1 tablet by mouth every 12 (twelve) hours. for 7 days  Dispense: 14 tablet; Refill: 0  -     ipratropium (ATROVENT) 42 mcg (0.06 %) nasal spray; 2 sprays by Nasal route 3 (three) times daily.  Dispense: 15 mL; Refill: 1  -     phenylephrine (SUDAFED PE) 10 MG Tab; Take 1 tablet (10 mg total) by mouth every 6 (six) hours as needed (cough).  Dispense: 16 tablet; Refill: 0       Health Maintenance Topics with due status: Not Due       Topic Last Completion Date    Lipid Panel 07/27/2021       Procedures     Future Appointments   Date Time Provider Department Center   2/7/2022  3:45 PM [facility] [place]   7/27/2022  3:00 PM [facility]        No follow-ups on file.       Signature:  Alex SUAZO  DO [place]    Date of encounter: 1/7/22    Education Documentation  No documentation found.  Education Comments  No comments found.       There are no Patient Instructions on file for this visit.

## 2022-02-04 ENCOUNTER — OFFICE VISIT (OUTPATIENT)
Dept: FAMILY MEDICINE | Facility: CLINIC | Age: 42
End: 2022-02-04
Payer: COMMERCIAL

## 2022-02-04 VITALS
SYSTOLIC BLOOD PRESSURE: 138 MMHG | DIASTOLIC BLOOD PRESSURE: 88 MMHG | TEMPERATURE: 98 F | HEIGHT: 75 IN | WEIGHT: 282 LBS | HEART RATE: 87 BPM | BODY MASS INDEX: 35.06 KG/M2 | RESPIRATION RATE: 18 BRPM | OXYGEN SATURATION: 99 %

## 2022-02-04 DIAGNOSIS — R30.0 DYSURIA: ICD-10-CM

## 2022-02-04 DIAGNOSIS — R10.9 RIGHT FLANK PAIN: Primary | ICD-10-CM

## 2022-02-04 LAB
BILIRUB SERPL-MCNC: NEGATIVE MG/DL
BLOOD URINE, POC: ABNORMAL
COLOR, POC UA: ABNORMAL
GLUCOSE UR QL STRIP: NEGATIVE
KETONES UR QL STRIP: NEGATIVE
LEUKOCYTE ESTERASE URINE, POC: NEGATIVE
NITRITE, POC UA: NEGATIVE
PH, POC UA: 5.5
PROTEIN, POC: ABNORMAL
SPECIFIC GRAVITY, POC UA: >1.03
UROBILINOGEN, POC UA: 1

## 2022-02-04 PROCEDURE — 3008F PR BODY MASS INDEX (BMI) DOCUMENTED: ICD-10-PCS | Mod: ,,, | Performed by: NURSE PRACTITIONER

## 2022-02-04 PROCEDURE — 1159F PR MEDICATION LIST DOCUMENTED IN MEDICAL RECORD: ICD-10-PCS | Mod: ,,, | Performed by: NURSE PRACTITIONER

## 2022-02-04 PROCEDURE — 3079F PR MOST RECENT DIASTOLIC BLOOD PRESSURE 80-89 MM HG: ICD-10-PCS | Mod: ,,, | Performed by: NURSE PRACTITIONER

## 2022-02-04 PROCEDURE — 99214 PR OFFICE/OUTPT VISIT, EST, LEVL IV, 30-39 MIN: ICD-10-PCS | Mod: ,,, | Performed by: NURSE PRACTITIONER

## 2022-02-04 PROCEDURE — 3075F SYST BP GE 130 - 139MM HG: CPT | Mod: ,,, | Performed by: NURSE PRACTITIONER

## 2022-02-04 PROCEDURE — 99214 OFFICE O/P EST MOD 30 MIN: CPT | Mod: ,,, | Performed by: NURSE PRACTITIONER

## 2022-02-04 PROCEDURE — 3075F PR MOST RECENT SYSTOLIC BLOOD PRESS GE 130-139MM HG: ICD-10-PCS | Mod: ,,, | Performed by: NURSE PRACTITIONER

## 2022-02-04 PROCEDURE — 3079F DIAST BP 80-89 MM HG: CPT | Mod: ,,, | Performed by: NURSE PRACTITIONER

## 2022-02-04 PROCEDURE — 4010F ACE/ARB THERAPY RXD/TAKEN: CPT | Mod: ,,, | Performed by: NURSE PRACTITIONER

## 2022-02-04 PROCEDURE — 1159F MED LIST DOCD IN RCRD: CPT | Mod: ,,, | Performed by: NURSE PRACTITIONER

## 2022-02-04 PROCEDURE — 81003 URINALYSIS AUTO W/O SCOPE: CPT | Mod: QW,,, | Performed by: NURSE PRACTITIONER

## 2022-02-04 PROCEDURE — 4010F PR ACE/ARB THEARPY RXD/TAKEN: ICD-10-PCS | Mod: ,,, | Performed by: NURSE PRACTITIONER

## 2022-02-04 PROCEDURE — 3008F BODY MASS INDEX DOCD: CPT | Mod: ,,, | Performed by: NURSE PRACTITIONER

## 2022-02-04 PROCEDURE — 81003 POCT URINALYSIS W/O SCOPE: ICD-10-PCS | Mod: QW,,, | Performed by: NURSE PRACTITIONER

## 2022-02-04 RX ORDER — TAMSULOSIN HYDROCHLORIDE 0.4 MG/1
0.4 CAPSULE ORAL DAILY
Qty: 30 CAPSULE | Refills: 0 | Status: SHIPPED | OUTPATIENT
Start: 2022-02-04 | End: 2022-02-14 | Stop reason: ALTCHOICE

## 2022-02-04 RX ORDER — KETOROLAC TROMETHAMINE 10 MG/1
10 TABLET, FILM COATED ORAL EVERY 6 HOURS
Qty: 20 TABLET | Refills: 0 | Status: SHIPPED | OUTPATIENT
Start: 2022-02-04 | End: 2022-02-09

## 2022-02-04 NOTE — PROGRESS NOTES
Tsering Awan NP   [facility]  [address]  [place]     PATIENT NAME: Candelario Starks  : 1980  DATE: 22  MRN: [id number]      Billing Provider: Tsering Awan NP  Level of Service:   Patient PCP Information     Provider PCP Type    [facility]          Reason for Visit / Chief Complaint: Back Pain (C/o pain in low back that radiates around to abdomen x 2 days.  Also pain  in testicles.   Pt states his urine was very dark last night, it cleared this morning but started being dark again and noticed streaks of bright red blood in urine.)       Update PCP  Update Chief Complaint         History of Present Illness / Problem Focused Workflow     Candelario Starks presents to the clinic   C/o back pain that radiates around to abd x 2 days and then felt squeezing sensation in right testicle. Stated his urine was very dark last night, it cheared this am bu started being dark again and noticed streaks of bright red blood in urine, rates pain mallorie 8 last night , currently only mallorie 3-4    Review of Systems     Review of Systems   Constitutional: Negative for chills, fatigue and fever.   HENT: Negative for nasal congestion, ear pain, facial swelling, hearing loss, mouth dryness, mouth sores, postnasal drip, rhinorrhea, sinus pressure/congestion and goiter.    Eyes: Negative for discharge and itching.   Respiratory: Negative for cough, shortness of breath and wheezing.    Cardiovascular: Negative for chest pain and leg swelling.   Gastrointestinal: Negative for abdominal pain, change in bowel habit and change in bowel habit.   Genitourinary: Positive for flank pain and testicular pain. Negative for difficulty urinating, dysuria, enuresis, frequency, hematuria and urgency.        Blood in urine   Neurological: Negative for dizziness, vertigo, syncope, weakness and headaches.   Psychiatric/Behavioral: Negative for decreased concentration.        Medical / Social / Family  "History     Past Medical History:   Diagnosis Date    Ankle joint pain     Depressive disorder     GERD (gastroesophageal reflux disease)     Hypertension     Sinus tarsi syndrome of left ankle     Vitamin D deficiency        Past Surgical History:   Procedure Laterality Date    ADENOIDECTOMY      TONSILLECTOMY         Social History    reports that he has been smoking. His smokeless tobacco use includes snuff. He reports previous alcohol use. He reports that he does not use drugs.    Family History  's family history includes Cancer in his father; Coronary artery disease in his father and mother; Hypertension in his father and mother; Stroke in his father.    Medications and Allergies     Medications  Outpatient Medications Marked as Taking for the 2/4/22 encounter (Office Visit) with Tsering Awan NP   Medication Sig Dispense Refill    gabapentin (NEURONTIN) 100 MG capsule Take 1 capsule (100 mg total) by mouth 2 (two) times daily. 180 capsule 1    HYDROcodone-acetaminophen (NORCO) 7.5-325 mg per tablet Take 1 tablet by mouth 2 (two) times daily as needed.      lisinopriL 10 MG tablet Take 1 tablet (10 mg total) by mouth once daily. 90 tablet 1    omeprazole (PRILOSEC) 40 MG capsule Take 1 capsule (40 mg total) by mouth once daily. 90 capsule 1       Allergies  Review of patient's allergies indicates:  No Known Allergies    Physical Examination     Vitals:    02/04/22 1311   BP: 138/88   BP Location: Left arm   Patient Position: Sitting   BP Method: Medium (Manual)   Pulse: 87   Resp: 18   Temp: 97.8 °F (36.6 °C)   TempSrc: Oral   SpO2: 99%   Weight: 127.9 kg (282 lb)   Height: 6' 3" (1.905 m)      Physical Exam  Vitals and nursing note reviewed.   Constitutional:       Appearance: Normal appearance.   HENT:      Head: Normocephalic.      Right Ear: Tympanic membrane, ear canal and external ear normal.      Left Ear: Tympanic membrane, ear canal and external ear normal.      Nose: Nose normal. "      Mouth/Throat:      Mouth: Mucous membranes are moist.      Pharynx: Oropharynx is clear.   Eyes:      Extraocular Movements: Extraocular movements intact.      Conjunctiva/sclera: Conjunctivae normal.      Pupils: Pupils are equal, round, and reactive to light.   Cardiovascular:      Rate and Rhythm: Normal rate and regular rhythm.      Pulses: Normal pulses.      Heart sounds: Normal heart sounds.   Pulmonary:      Effort: Pulmonary effort is normal.      Breath sounds: Normal breath sounds.   Abdominal:      General: Bowel sounds are normal.      Palpations: Abdomen is soft.   Musculoskeletal:         General: Normal range of motion.   Skin:     General: Skin is warm and dry.      Capillary Refill: Capillary refill takes less than 2 seconds.   Neurological:      General: No focal deficit present.      Mental Status: He is alert and oriented to person, place, and time.   Psychiatric:         Mood and Affect: Mood normal.         Behavior: Behavior normal.          Assessment and Plan (including Health Maintenance)      Problem List  Smart Sets  Document Outside HM   :    Plan: I feel pt prob passed kidney stone, but if pain increases go to er, increase fluids, meds as orered, return to [facility] Monday for eval per jermain, go to ER if pain or blood increases    Right flank pain  -     POCT URINALYSIS W/O SCOPE  -     X-Ray Abdomen AP 1 View; Future; Expected date: 02/04/2022  -     tamsulosin (FLOMAX) 0.4 mg Cap; Take 1 capsule (0.4 mg total) by mouth once daily.  Dispense: 30 capsule; Refill: 0  -     ketorolac (TORADOL) 10 mg tablet; Take 1 tablet (10 mg total) by mouth every 6 (six) hours. for 5 days  Dispense: 20 tablet; Refill: 0    Dysuria  -     POCT URINALYSIS W/O SCOPE            Health Maintenance Due   Topic Date Due    Hepatitis C Screening  Never done    COVID-19 Vaccine (1) Never done    Pneumococcal Vaccines (Age 0-64) (1 of 2 - PPSV23) Never done    HIV Screening  Never done    TETANUS  VACCINE  Never done    Influenza Vaccine (1) Never done       Problem List Items Addressed This Visit        Renal/    Dysuria    Relevant Orders    POCT URINALYSIS W/O SCOPE (Completed)       GI    Right flank pain - Primary    Relevant Medications    tamsulosin (FLOMAX) 0.4 mg Cap    ketorolac (TORADOL) 10 mg tablet    Other Relevant Orders    POCT URINALYSIS W/O SCOPE (Completed)    X-Ray Abdomen AP 1 View            Health Maintenance Topics with due status: Not Due       Topic Last Completion Date    Lipid Panel 07/27/2021       Procedures     Future Appointments   Date Time Provider Department Center   2/7/2022  3:45 PM [facility]   7/27/2022  3:00 PM [facility]        Follow up in about 3 days (around 2/7/2022) for f\u.       Signature:  Tsering Awan NP    Date of encounter: 2/4/22

## 2022-02-04 NOTE — PATIENT INSTRUCTIONS
Patient Education       Kidney Stones Discharge Instructions   About this topic   Kidney stones are a build up of salts and minerals from your urine. Most of the time a kidney stone leaves your body when you urinate. Sometimes the kidney stone can get stuck on the way out and then you have pain in your lower back, side, or lower belly. You can also have blood in your urine and it may hurt when you urinate.  Kidney stones are hard and can get stuck in your urinary tract or block the flow of urine on the way out of the body. The urinary tract is made up of the kidney, ureters, bladder, and urethra. The kidneys make urine and it drains down into tubes called ureters. These ureters are connected to the bladder. The bladder then squeezes out the urine and it exits the body through the urethra.  Treatment depends on the type of stone, size of the stone, and where it is along your urinary tract. Your doctor may send the stone to a lab to learn more about it and how to best treat you.     What care is needed at home?   · Ask your doctor what you need to do when you go home. Make sure you ask questions if you do not understand what the doctor says.  · Drink lots of fluids to help pass your kidney stone.  · You may be asked to use a filter to strain your urine. The filter catches the stones.  · Your doctor may want to send the stones to a lab to test them.  · You may need to take medicine to help with the pain as your kidney stone passes.  What follow-up care is needed?   · Your doctor may ask you to make visits to the office to check on your progress. Be sure to keep these visits.  · Your doctor will tell you if other tests are needed.  · Your doctor may send you to a kidney specialist. This kind of doctor is called a urologist.  What drugs may be needed?   The doctor may order drugs to:  · Help with pain  · Prevent infection  · Help flush out kidney stones  · Prevent kidney stones  Will physical activity be limited?   You  may have to limit your activity. Talk to your doctor about the right amount of activity for you.  What changes to diet are needed?   · Talk to your doctor or dietitian about your personal diet plan. Ask if there are foods you should eat more or less of, based on the kind of stone you had.  · Avoid caffeinated drinks that can overwork your urinary tract.  What problems could happen?   · Kidney infection  · Kidney damage  · Block in the urinary system  · High blood pressure  What can be done to prevent this health problem?   · Prevent or treat urinary tract infections.   · Drink lots of water during the day. When you have less fluid in your body, urine becomes concentrated. This increases your chance of kidney stones.  · Take drugs as ordered by your doctor.  · Limit foods or drugs that may cause kidney stones.  When do I need to call the doctor?   · You do not urinate for more than 8 hours.  · You have a fever of 100.4°F (38°C) or higher or chills.  · Your urine is cloudy, smells bad, or is more bloody.  · The pain from your kidney stone gets very bad and is not helped by pain medicine.  · You are throwing up and cant keep liquids down.  · Your pain does not go away after 1 to 2 weeks.  Teach Back: Helping You Understand   The Teach Back Method helps you understand the information we are giving you. After you talk with the staff, tell them in your own words what you learned. This helps to make sure the staff has described each thing clearly. It also helps to explain things that may have been confusing. Before going home, make sure you can do these:  · I can tell you about my condition.  · I can tell you what changes I need to make with my diet or drugs.  · I can tell you what I will do if I have very bad pain in my back or side.  Where can I learn more?   American Academy of Family Physicians  https://familydoctor.org/condition/kidney-stones/   National Kidney and Urologic Diseases Information  Clearinghouse  http://kidney.niddk.nih.gov/kudiseases/pubs/stones_ez/   NHS Choices  https://www.nhs.uk/conditions/kidney-stones/   Last Reviewed Date   2021-06-08  Consumer Information Use and Disclaimer   This information is not specific medical advice and does not replace information you receive from your health care provider. This is only a brief summary of general information. It does NOT include all information about conditions, illnesses, injuries, tests, procedures, treatments, therapies, discharge instructions or life-style choices that may apply to you. You must talk with your health care provider for complete information about your health and treatment options. This information should not be used to decide whether or not to accept your health care providers advice, instructions or recommendations. Only your health care provider has the knowledge and training to provide advice that is right for you.  Copyright   Copyright © 2021 UpToDate, Inc. and its affiliates and/or licensors. All rights reserved.  Patient Education       Kidney Stone Diet   About this topic   A kidney stone is a hard mass that is made up of tiny crystals in the kidneys. They get stuck and cause pain as they try to leave the kidney. There are different kinds of kidney stones. The most common ones are made of calcium and oxalate. What you eat and drink can cause kidney stones to form or grow.  What will the results be?   When you make changes to your diet, you may help prevent kidney stones.  What drugs may be needed?   Talk to your doctor about what drugs you take. You may need to start or stop taking a drug, vitamin, or supplement based on your needs.  What changes to diet are needed?   The kind of kidney stone you have will guide what foods you should eat or avoid. Talk to your doctor or dietitian about your diet based on what kind of kidney stone you had. They may suggest you:  · Drink more fluids. Then you will make more urine.  Drink at least 12 cups of fluids (3 liters) each day. Drink more if it is hot or you exercise.  · Eat less salt. You get most of the salt or sodium in your diet from processed or prepared foods. Choose no salt added or low-salt foods. Foods you may want to limit or avoid are: Cured meats like hot dogs, bratwurst, or frias; pickles, olives, canned pasta sauce or soup, some salad dressings, some frozen meals, and table salt.  · Eat less animal protein. This includes meat, poultry, seafood, and eggs. Talk to your doctor or dietitian about how much meat or protein you need each day.  · Get the right amount of calcium in your diet. Too much or too little calcium can cause kidney stones to form. Talk to your doctor or dietitian about how much calcium you need each day. Calcium is in dairy products such as milk, cheese, and yogurt. You can also get calcium from cooked broccoli, beans, dried figs, and tofu.  · Eat less foods that are high in oxalate. Foods high in oxalate include spinach, rhubarb, beets, bran flakes, potatoes, and nuts or nut butters.  · Eat less foods that are high in purine. Foods high in purines include organ meat, frias, anchovies, sardines, and herring. You should also limit alcohol.  · Eat less foods and drinks sweetened with high fructose corn syrup.  · Get at least 5 servings of fruits and vegetables a day.  · Get the right amount of magnesium. Ask your doctor or dietitian how much magnesium you need each day. Foods high in magnesium include almonds, cashews, peanuts, peanut butter, black beans, kidney beans, oatmeal, brown rice, whole wheat bread, cooked spinach, baked potato with the skin, and edamame. You may need a magnesium supplement if you dont get enough in your diet.  · Avoid high doses of Vitamin C. High intake of Vitamin C may produce more oxalate.  Helpful tips   · Replace some of the animal protein in your diet with plant-based foods. This includes beans; dried peas; lentils; nuts;  seeds; and soy products like tofu. These foods are high in protein.  · Learn to read the food label. The food label tells you what is in the food you eat.  Where can I learn more?   National Kidney Foundation  https://www.kidney.org/atoz/content/kidneystones_prevent   Last Reviewed Date   2021-10-08  Consumer Information Use and Disclaimer   This information is not specific medical advice and does not replace information you receive from your health care provider. This is only a brief summary of general information. It does NOT include all information about conditions, illnesses, injuries, tests, procedures, treatments, therapies, discharge instructions or life-style choices that may apply to you. You must talk with your health care provider for complete information about your health and treatment options. This information should not be used to decide whether or not to accept your health care providers advice, instructions or recommendations. Only your health care provider has the knowledge and training to provide advice that is right for you.  Copyright   Copyright © 2021 UpToDate, Inc. and its affiliates and/or licensors. All rights reserved.

## 2022-02-14 ENCOUNTER — OFFICE VISIT (OUTPATIENT)
Dept: FAMILY MEDICINE | Facility: CLINIC | Age: 42
End: 2022-02-14
Payer: COMMERCIAL

## 2022-02-14 VITALS
TEMPERATURE: 99 F | HEIGHT: 75 IN | WEIGHT: 282 LBS | HEART RATE: 74 BPM | BODY MASS INDEX: 35.06 KG/M2 | OXYGEN SATURATION: 97 % | SYSTOLIC BLOOD PRESSURE: 130 MMHG | RESPIRATION RATE: 18 BRPM | DIASTOLIC BLOOD PRESSURE: 80 MMHG

## 2022-02-14 DIAGNOSIS — I10 HYPERTENSION, UNSPECIFIED TYPE: Primary | ICD-10-CM

## 2022-02-14 DIAGNOSIS — K21.9 GASTROESOPHAGEAL REFLUX DISEASE, UNSPECIFIED WHETHER ESOPHAGITIS PRESENT: ICD-10-CM

## 2022-02-14 DIAGNOSIS — M25.572 SINUS TARSI SYNDROME OF LEFT ANKLE: ICD-10-CM

## 2022-02-14 LAB
ALBUMIN SERPL BCP-MCNC: 3.9 G/DL (ref 3.5–5)
ALBUMIN/GLOB SERPL: 1.1 {RATIO}
ALP SERPL-CCNC: 96 U/L (ref 45–115)
ALT SERPL W P-5'-P-CCNC: 29 U/L (ref 16–61)
ANION GAP SERPL CALCULATED.3IONS-SCNC: 5 MMOL/L (ref 7–16)
AST SERPL W P-5'-P-CCNC: 14 U/L (ref 15–37)
BILIRUB SERPL-MCNC: 0.3 MG/DL (ref 0–1.2)
BUN SERPL-MCNC: 14 MG/DL (ref 7–18)
BUN/CREAT SERPL: 18 (ref 6–20)
CALCIUM SERPL-MCNC: 9.1 MG/DL (ref 8.5–10.1)
CHLORIDE SERPL-SCNC: 111 MMOL/L (ref 98–107)
CO2 SERPL-SCNC: 29 MMOL/L (ref 21–32)
CREAT SERPL-MCNC: 0.78 MG/DL (ref 0.7–1.3)
GLOBULIN SER-MCNC: 3.6 G/DL (ref 2–4)
GLUCOSE SERPL-MCNC: 82 MG/DL (ref 74–106)
POTASSIUM SERPL-SCNC: 3.8 MMOL/L (ref 3.5–5.1)
PROT SERPL-MCNC: 7.5 G/DL (ref 6.4–8.2)
SODIUM SERPL-SCNC: 141 MMOL/L (ref 136–145)

## 2022-02-14 PROCEDURE — 99213 PR OFFICE/OUTPT VISIT, EST, LEVL III, 20-29 MIN: ICD-10-PCS | Mod: ,,, | Performed by: NURSE PRACTITIONER

## 2022-02-14 PROCEDURE — 3079F DIAST BP 80-89 MM HG: CPT | Mod: ,,, | Performed by: NURSE PRACTITIONER

## 2022-02-14 PROCEDURE — 1159F MED LIST DOCD IN RCRD: CPT | Mod: ,,, | Performed by: NURSE PRACTITIONER

## 2022-02-14 PROCEDURE — 3008F PR BODY MASS INDEX (BMI) DOCUMENTED: ICD-10-PCS | Mod: ,,, | Performed by: NURSE PRACTITIONER

## 2022-02-14 PROCEDURE — 3008F BODY MASS INDEX DOCD: CPT | Mod: ,,, | Performed by: NURSE PRACTITIONER

## 2022-02-14 PROCEDURE — 4010F ACE/ARB THERAPY RXD/TAKEN: CPT | Mod: ,,, | Performed by: NURSE PRACTITIONER

## 2022-02-14 PROCEDURE — 99213 OFFICE O/P EST LOW 20 MIN: CPT | Mod: ,,, | Performed by: NURSE PRACTITIONER

## 2022-02-14 PROCEDURE — 4010F PR ACE/ARB THEARPY RXD/TAKEN: ICD-10-PCS | Mod: ,,, | Performed by: NURSE PRACTITIONER

## 2022-02-14 PROCEDURE — 1160F PR REVIEW ALL MEDS BY PRESCRIBER/CLIN PHARMACIST DOCUMENTED: ICD-10-PCS | Mod: ,,, | Performed by: NURSE PRACTITIONER

## 2022-02-14 PROCEDURE — 3079F PR MOST RECENT DIASTOLIC BLOOD PRESSURE 80-89 MM HG: ICD-10-PCS | Mod: ,,, | Performed by: NURSE PRACTITIONER

## 2022-02-14 PROCEDURE — 80053 COMPREHEN METABOLIC PANEL: CPT | Mod: ,,, | Performed by: CLINICAL MEDICAL LABORATORY

## 2022-02-14 PROCEDURE — 3075F PR MOST RECENT SYSTOLIC BLOOD PRESS GE 130-139MM HG: ICD-10-PCS | Mod: ,,, | Performed by: NURSE PRACTITIONER

## 2022-02-14 PROCEDURE — 80053 COMPREHENSIVE METABOLIC PANEL: ICD-10-PCS | Mod: ,,, | Performed by: CLINICAL MEDICAL LABORATORY

## 2022-02-14 PROCEDURE — 1160F RVW MEDS BY RX/DR IN RCRD: CPT | Mod: ,,, | Performed by: NURSE PRACTITIONER

## 2022-02-14 PROCEDURE — 1159F PR MEDICATION LIST DOCUMENTED IN MEDICAL RECORD: ICD-10-PCS | Mod: ,,, | Performed by: NURSE PRACTITIONER

## 2022-02-14 PROCEDURE — 3075F SYST BP GE 130 - 139MM HG: CPT | Mod: ,,, | Performed by: NURSE PRACTITIONER

## 2022-02-14 RX ORDER — CEPHALEXIN 500 MG/1
500 CAPSULE ORAL 3 TIMES DAILY
COMMUNITY
Start: 2022-02-11 | End: 2022-07-25

## 2022-02-14 RX ORDER — OMEPRAZOLE 40 MG/1
40 CAPSULE, DELAYED RELEASE ORAL DAILY
Qty: 90 CAPSULE | Refills: 1 | Status: SHIPPED | OUTPATIENT
Start: 2022-02-14

## 2022-02-14 RX ORDER — GABAPENTIN 400 MG/1
400 CAPSULE ORAL 2 TIMES DAILY
Qty: 180 CAPSULE | Refills: 1 | Status: SHIPPED | OUTPATIENT
Start: 2022-02-14 | End: 2022-09-15

## 2022-02-14 RX ORDER — LISINOPRIL 10 MG/1
10 TABLET ORAL DAILY
Qty: 90 TABLET | Refills: 1 | Status: SHIPPED | OUTPATIENT
Start: 2022-02-14 | End: 2023-01-27

## 2022-02-14 RX ORDER — AMLODIPINE BESYLATE 5 MG/1
5 TABLET ORAL DAILY
Qty: 90 TABLET | Refills: 1 | Status: SHIPPED | OUTPATIENT
Start: 2022-02-14 | End: 2022-09-15

## 2022-02-14 NOTE — PROGRESS NOTES
2022     CONCEPCIÓN Sanders   [facility]  [address]  [place] [address]     PATIENT NAME: Candelario Starks  : 1980  DATE: 22  MRN: [id number]      Billing Provider: CONCEPCIÓN Sanders  Level of Service:   Patient PCP Information     Provider PCP Type    CONCEPCIÓN Sanders General          Reason for Visit / Chief Complaint: Hypertension ([facility] for HTN, Hyperlipidemia)       Update PCP  Update Chief Complaint         History of Present Illness / Problem Focused Workflow     Candelario Starks presents to the clinic [facility] feeling well did have kidney stone recently having sinus congestion      Review of Systems     Review of Systems   Constitutional: Negative for appetite change, chills, fatigue and fever.   HENT: Positive for nasal congestion. Negative for ear pain, sore throat, trouble swallowing and voice change.    Eyes: Negative for visual disturbance.   Respiratory: Positive for cough. Negative for chest tightness, shortness of breath and wheezing.    Cardiovascular: Negative for chest pain, palpitations and leg swelling.   Gastrointestinal: Negative for abdominal pain, change in bowel habit, constipation, diarrhea, nausea, vomiting and change in bowel habit.   Genitourinary: Negative for difficulty urinating.   Integumentary:  Negative for pallor and rash.   Neurological: Negative for dizziness, light-headedness and headaches.   Hematological: Negative for adenopathy.        Medical / Social / Family History     Past Medical History:   Diagnosis Date    Ankle joint pain     Depressive disorder     GERD (gastroesophageal reflux disease)     Hypertension     Sinus tarsi syndrome of left ankle     Vitamin D deficiency        Past Surgical History:   Procedure Laterality Date    ADENOIDECTOMY      TONSILLECTOMY         Social History    reports that he has been smoking. His smokeless tobacco use includes snuff. He reports previous  "alcohol use. He reports that he does not use drugs.    Family History  's family history includes Cancer in his father; Coronary artery disease in his father and mother; Hypertension in his father and mother; Stroke in his father.    Medications and Allergies     Medications  Outpatient Medications Marked as Taking for the 2/14/22 encounter (Office Visit) with KimberlyCONCEPCIÓN Castro   Medication Sig Dispense Refill    cephALEXin (KEFLEX) 500 MG capsule Take 500 mg by mouth 3 (three) times daily.      HYDROcodone-acetaminophen (NORCO) 7.5-325 mg per tablet Take 1 tablet by mouth 2 (two) times daily as needed.      [DISCONTINUED] amLODIPine (NORVASC) 5 MG tablet Take 1 tablet (5 mg total) by mouth once daily. 90 tablet 1    [DISCONTINUED] lisinopriL 10 MG tablet Take 1 tablet (10 mg total) by mouth once daily. 90 tablet 1    [DISCONTINUED] omeprazole (PRILOSEC) 40 MG capsule Take 1 capsule (40 mg total) by mouth once daily. 90 capsule 1       Allergies  Review of patient's allergies indicates:  No Known Allergies    Physical Examination     Vitals:    02/14/22 1602 02/14/22 1651   BP: (!) 142/80 130/80   BP Location: Left arm Left arm   Patient Position: Sitting Sitting   Pulse: 74    Resp: 18    Temp: 98.7 °F (37.1 °C)    TempSrc: Oral    SpO2: 97%    Weight: 127.9 kg (282 lb)    Height: 6' 3" (1.905 m)       Physical Exam  Vitals and nursing note reviewed.   Constitutional:       General: He is not in acute distress.     Appearance: Normal appearance. He is obese. He is not ill-appearing, toxic-appearing or diaphoretic.   HENT:      Head: Normocephalic.      Right Ear: Tympanic membrane, ear canal and external ear normal.      Left Ear: Tympanic membrane, ear canal and external ear normal.      Nose: Nose normal.      Mouth/Throat:      Mouth: Mucous membranes are moist.   Eyes:      Conjunctiva/sclera: Conjunctivae normal.      Pupils: Pupils are equal, round, and reactive to light.   Cardiovascular: "      Rate and Rhythm: Normal rate and regular rhythm.      Heart sounds: Normal heart sounds.   Pulmonary:      Effort: Pulmonary effort is normal.      Breath sounds: Normal breath sounds.   Musculoskeletal:      Cervical back: Normal range of motion and neck supple. No rigidity or tenderness.   Skin:     General: Skin is warm and dry.      Capillary Refill: Capillary refill takes less than 2 seconds.   Neurological:      Mental Status: He is alert and oriented to person, place, and time.   Psychiatric:         Behavior: Behavior normal.          Assessment and Plan (including Health Maintenance)      Problem List  [employer]  Document Outside HM   :    Plan:   - work on diet, specifically cutting back bread, breaded things, cereal, pasta, potatoes, rice  - try to exercise at least 150min a week divided however you want  - if you can do weight, even very light weight do them  - try not to use to much salt, if any, remember that things that are preserved or frozen often contain large amounts of salt as a preserve      Health Maintenance Due   Topic Date Due    Hepatitis C Screening  Never done    COVID-19 Vaccine (1) Never done    Pneumococcal Vaccines (Age 0-64) (1 of 2 - PPSV23) Never done    HIV Screening  Never done    TETANUS VACCINE  Never done    Influenza Vaccine (1) Never done       Problem List Items Addressed This Visit        Cardiac/Vascular    Hypertension - Primary    Relevant Medications    lisinopriL 10 MG tablet    amLODIPine (NORVASC) 5 MG tablet    Other Relevant Orders    Comprehensive Metabolic Panel       Orthopedic    Sinus tarsi syndrome of left ankle    Relevant Medications    gabapentin (NEURONTIN) 400 MG capsule      Other Visit Diagnoses     Gastroesophageal reflux disease, unspecified whether esophagitis present        Relevant Medications    omeprazole (PRILOSEC) 40 MG capsule        Hypertension, unspecified type  -     lisinopriL 10 MG tablet; Take 1 tablet (10 mg total) by  mouth once daily.  Dispense: 90 tablet; Refill: 1  -     amLODIPine (NORVASC) 5 MG tablet; Take 1 tablet (5 mg total) by mouth once daily.  Dispense: 90 tablet; Refill: 1  -     Comprehensive Metabolic Panel; Future; Expected date: 02/14/2022    Gastroesophageal reflux disease, unspecified whether esophagitis present  -     omeprazole (PRILOSEC) 40 MG capsule; Take 1 capsule (40 mg total) by mouth once daily.  Dispense: 90 capsule; Refill: 1    Sinus tarsi syndrome of left ankle  -     gabapentin (NEURONTIN) 400 MG capsule; Take 1 capsule (400 mg total) by mouth 2 (two) times daily.  Dispense: 180 capsule; Refill: 1       Health Maintenance Topics with due status: Not Due       Topic Last Completion Date    Lipid Panel 07/27/2021       Procedures     Future Appointments   Date Time Provider Department Center   5/12/2022  3:45 PM [facility]   [facility]/17/2022  [facility]00 [facility]        Follow up in about 3 months (around 5/14/2022).     Signature:  CONCEPCIÓN Sanders    Date of encounter: 2/14/22

## 2022-02-14 NOTE — PATIENT INSTRUCTIONS
- work on diet, specifically cutting back bread, breaded things, cereal, pasta, potatoes, rice  - try to exercise at least 150min a week divided however you want  - if you can do weight, even very light weight do them  - try not to use to much salt, if any, remember that things that are preserved or frozen often contain large amounts of salt as a preserve

## 2022-07-25 ENCOUNTER — OFFICE VISIT (OUTPATIENT)
Dept: FAMILY MEDICINE | Facility: CLINIC | Age: 42
End: 2022-07-25
Payer: COMMERCIAL

## 2022-07-25 VITALS
DIASTOLIC BLOOD PRESSURE: 70 MMHG | HEART RATE: 96 BPM | TEMPERATURE: 99 F | RESPIRATION RATE: 18 BRPM | SYSTOLIC BLOOD PRESSURE: 120 MMHG | HEIGHT: 75 IN | BODY MASS INDEX: 35.31 KG/M2 | WEIGHT: 284 LBS | OXYGEN SATURATION: 98 %

## 2022-07-25 DIAGNOSIS — R05.8 COUGH WITH EXPOSURE TO COVID-19 VIRUS: ICD-10-CM

## 2022-07-25 DIAGNOSIS — I10 HYPERTENSION, UNSPECIFIED TYPE: ICD-10-CM

## 2022-07-25 DIAGNOSIS — Z20.822 COUGH WITH EXPOSURE TO COVID-19 VIRUS: ICD-10-CM

## 2022-07-25 DIAGNOSIS — U07.1 COVID-19: Primary | ICD-10-CM

## 2022-07-25 DIAGNOSIS — R05.9 COUGH: ICD-10-CM

## 2022-07-25 LAB
CTP QC/QA: YES
FLUAV AG NPH QL: NEGATIVE
FLUBV AG NPH QL: NEGATIVE
SARS-COV-2 AG RESP QL IA.RAPID: POSITIVE

## 2022-07-25 PROCEDURE — 1160F PR REVIEW ALL MEDS BY PRESCRIBER/CLIN PHARMACIST DOCUMENTED: ICD-10-PCS | Mod: ,,, | Performed by: NURSE PRACTITIONER

## 2022-07-25 PROCEDURE — 3008F PR BODY MASS INDEX (BMI) DOCUMENTED: ICD-10-PCS | Mod: ,,, | Performed by: NURSE PRACTITIONER

## 2022-07-25 PROCEDURE — 3078F DIAST BP <80 MM HG: CPT | Mod: ,,, | Performed by: NURSE PRACTITIONER

## 2022-07-25 PROCEDURE — 87428 POCT SARS-COV2 (COVID) WITH FLU ANTIGEN: ICD-10-PCS | Mod: QW,,, | Performed by: NURSE PRACTITIONER

## 2022-07-25 PROCEDURE — 99213 OFFICE O/P EST LOW 20 MIN: CPT | Mod: ,,, | Performed by: NURSE PRACTITIONER

## 2022-07-25 PROCEDURE — 3074F SYST BP LT 130 MM HG: CPT | Mod: ,,, | Performed by: NURSE PRACTITIONER

## 2022-07-25 PROCEDURE — 4010F PR ACE/ARB THEARPY RXD/TAKEN: ICD-10-PCS | Mod: ,,, | Performed by: NURSE PRACTITIONER

## 2022-07-25 PROCEDURE — 87428 SARSCOV & INF VIR A&B AG IA: CPT | Mod: QW,,, | Performed by: NURSE PRACTITIONER

## 2022-07-25 PROCEDURE — 3078F PR MOST RECENT DIASTOLIC BLOOD PRESSURE < 80 MM HG: ICD-10-PCS | Mod: ,,, | Performed by: NURSE PRACTITIONER

## 2022-07-25 PROCEDURE — 99213 PR OFFICE/OUTPT VISIT, EST, LEVL III, 20-29 MIN: ICD-10-PCS | Mod: ,,, | Performed by: NURSE PRACTITIONER

## 2022-07-25 PROCEDURE — 4010F ACE/ARB THERAPY RXD/TAKEN: CPT | Mod: ,,, | Performed by: NURSE PRACTITIONER

## 2022-07-25 PROCEDURE — 1160F RVW MEDS BY RX/DR IN RCRD: CPT | Mod: ,,, | Performed by: NURSE PRACTITIONER

## 2022-07-25 PROCEDURE — 3074F PR MOST RECENT SYSTOLIC BLOOD PRESSURE < 130 MM HG: ICD-10-PCS | Mod: ,,, | Performed by: NURSE PRACTITIONER

## 2022-07-25 PROCEDURE — 3008F BODY MASS INDEX DOCD: CPT | Mod: ,,, | Performed by: NURSE PRACTITIONER

## 2022-07-25 PROCEDURE — 1159F PR MEDICATION LIST DOCUMENTED IN MEDICAL RECORD: ICD-10-PCS | Mod: ,,, | Performed by: NURSE PRACTITIONER

## 2022-07-25 PROCEDURE — 1159F MED LIST DOCD IN RCRD: CPT | Mod: ,,, | Performed by: NURSE PRACTITIONER

## 2022-07-25 RX ORDER — BENZONATATE 200 MG/1
200 CAPSULE ORAL 3 TIMES DAILY PRN
Qty: 30 CAPSULE | Refills: 0 | Status: SHIPPED | OUTPATIENT
Start: 2022-07-25 | End: 2022-08-04

## 2022-07-25 NOTE — LETTER
July 25, 2022      [facility] [address] [place] [address]  Phone: [phone]  Fax: [phone]       Patient: Candelario Starks   YOB: 1980  Date of Visit: 07/25/2022    To Whom It May Concern:    Melissa Starks  was at [facility] on 07/25/2022. The patient may return to work/school on 8/1/2022 with no restrictions. If you have any questions or concerns, or if I can be of further assistance, please do not hesitate to contact me.    Sincerely,  Sanjuana BEEBE;   Leilani Grayson RN

## 2022-07-25 NOTE — PATIENT INSTRUCTIONS
"Symptomatic/Supportive Care  For fever and pain, Acetaminophen (Tylenol)  or Ibuprofen (Motrin) (if you are able to take this).  To dry out the nose and relieve nasal obstruction, try a traditional antihistamine. This is Claritin, Zyrtec, etc. This is best if drainage is clear/thin.  To make blowing your nose easier, take guaifenesin (Mucinex). These products thin mucous and can help thin thick, discolored drainage. Nasal saline can be used to treat dry or irritated nasal passages and to thin mucous in the nose.  To relieve a "stuffy" or clogged nose, try an oral decongestant (sudafed) which is available at the pharmacy counter. This may not be appropriate for patients with uncontrolled high blood pressure or heart issues.  To suppress coughing, take dextromethorphan can be helpful as a cough suppressant. A "-DM" at the end of a medications name can suggest that it contains a cough suppressant (dextromethorphan). Delsym and Robitussin are good choices.   Wash your hands frequently.  Consider masking indoors.  Socially distance from others.  Stay home if you experience symptoms.  Avoid crowded spaces.  Rest and stay hydrated   If you develop any new or worsening symptoms, please let us know.   "

## 2022-07-25 NOTE — PROGRESS NOTES
2022     CONCEPCIÓN Sanders   [facility]  [address]  [place] [address]     PATIENT NAME: Candelario Starks  : 1980  DATE: 22  MRN: [id number]      Billing Provider: CONCEPCIÓN Sanders  Level of Service:   Patient PCP Information     Provider PCP Type    CONCEPCIÓN Sanders General          Reason for Visit / Chief Complaint: Cough, Generalized Body Aches, and Headache (Started Saturday, had chills)       Update PCP  Update Chief Complaint         History of Present Illness / Problem Focused Workflow     Candelario Starks presents to the clinic fever chills body aches headache       Review of Systems     Review of Systems   Constitutional: Positive for chills, fatigue and fever.   HENT: Negative for ear pain, mouth sores and sore throat.    Respiratory: Positive for cough, shortness of breath and wheezing. Negative for chest tightness.    Cardiovascular: Negative for palpitations.   Gastrointestinal: Negative for abdominal pain, change in bowel habit, diarrhea, nausea, vomiting and change in bowel habit.   Musculoskeletal: Positive for back pain and myalgias.   Neurological: Positive for headaches.        Medical / Social / Family History     Past Medical History:   Diagnosis Date    Ankle joint pain     Depressive disorder     GERD (gastroesophageal reflux disease)     Hypertension     Sinus tarsi syndrome of left ankle     Vitamin D deficiency        Past Surgical History:   Procedure Laterality Date    ADENOIDECTOMY      TONSILLECTOMY         Social History    reports that he has been smoking. His smokeless tobacco use includes snuff. He reports previous alcohol use. He reports that he does not use drugs.    Family History  's family history includes Cancer in his father; Coronary artery disease in his father and mother; Hypertension in his father and mother; Stroke in his father.    Medications and Allergies  "    Medications  Outpatient Medications Marked as Taking for the 7/25/22 encounter (Office Visit) with CONCEPCIÓN Sanders   Medication Sig Dispense Refill    amLODIPine (NORVASC) 5 MG tablet Take 1 tablet (5 mg total) by mouth once daily. 90 tablet 1    gabapentin (NEURONTIN) 400 MG capsule Take 1 capsule (400 mg total) by mouth 2 (two) times daily. 180 capsule 1    HYDROcodone-acetaminophen (NORCO) 7.5-325 mg per tablet Take 1 tablet by mouth 2 (two) times daily as needed.      lisinopriL 10 MG tablet Take 1 tablet (10 mg total) by mouth once daily. 90 tablet 1    omeprazole (PRILOSEC) 40 MG capsule Take 1 capsule (40 mg total) by mouth once daily. 90 capsule 1       Allergies  Review of patient's allergies indicates:  No Known Allergies    Physical Examination     Vitals:    07/25/22 0824   BP: 120/70   BP Location: Right arm   Patient Position: Sitting   Pulse: 96   Resp: 18   Temp: 99.4 °F (37.4 °C)   TempSrc: Oral   SpO2: 98%   Weight: 128.8 kg (284 lb)   Height: 6' 3" (1.905 m)      Physical Exam  Vitals and nursing note reviewed.   Constitutional:       General: He is not in acute distress.     Appearance: Normal appearance. He is not ill-appearing, toxic-appearing or diaphoretic.   HENT:      Head: Normocephalic.      Right Ear: Tympanic membrane, ear canal and external ear normal.      Left Ear: Tympanic membrane, ear canal and external ear normal.      Nose: Nose normal.      Mouth/Throat:      Mouth: Mucous membranes are moist.      Pharynx: Oropharynx is clear. No oropharyngeal exudate or posterior oropharyngeal erythema.   Eyes:      Conjunctiva/sclera: Conjunctivae normal.      Pupils: Pupils are equal, round, and reactive to light.   Cardiovascular:      Rate and Rhythm: Normal rate and regular rhythm.      Heart sounds: Normal heart sounds.   Pulmonary:      Effort: Pulmonary effort is normal.      Breath sounds: Normal breath sounds. No wheezing, rhonchi or rales.   Musculoskeletal: " "     Cervical back: Normal range of motion and neck supple. No rigidity or tenderness.   Lymphadenopathy:      Cervical: No cervical adenopathy.   Skin:     General: Skin is warm and dry.      Capillary Refill: Capillary refill takes less than 2 seconds.   Neurological:      General: No focal deficit present.      Mental Status: He is alert and oriented to person, place, and time.   Psychiatric:         Mood and Affect: Mood normal.         Behavior: Behavior normal.          Assessment and Plan (including Health Maintenance)      Problem List  [employer]  Document Outside HM   :    Plan:   Symptomatic/Supportive Care  For fever and pain, Acetaminophen (Tylenol)  or Ibuprofen (Motrin) (if you are able to take this).  To dry out the nose and relieve nasal obstruction, try a traditional antihistamine. This is Claritin, Zyrtec, etc. This is best if drainage is clear/thin.  To make blowing your nose easier, take guaifenesin (Mucinex). These products thin mucous and can help thin thick, discolored drainage. Nasal saline can be used to treat dry or irritated nasal passages and to thin mucous in the nose.  To relieve a "stuffy" or clogged nose, try an oral decongestant (sudafed) which is available at the pharmacy counter. This may not be appropriate for patients with uncontrolled high blood pressure or heart issues.  To suppress coughing, take dextromethorphan can be helpful as a cough suppressant. A "-DM" at the end of a medications name can suggest that it contains a cough suppressant (dextromethorphan). Delsym and Robitussin are good choices.   Wash your hands frequently.  Consider masking indoors.  Socially distance from others.  Stay home if you experience symptoms.  Avoid crowded spaces.  Rest and stay hydrated   If you develop any new or worsening symptoms, please let us know.      Health Maintenance Due   Topic Date Due    Hepatitis C Screening  Never done    COVID-19 Vaccine (1) Never done    Pneumococcal " Vaccines (Age 0-64) (1 - PCV) Never done    HIV Screening  Never done    TETANUS VACCINE  Never done       Problem List Items Addressed This Visit        Pulmonary    Cough    Relevant Medications    benzonatate (TESSALON) 200 MG capsule       Cardiac/Vascular    Hypertension      Other Visit Diagnoses     COVID-19    -  Primary    Cough with exposure to COVID-19 virus        Relevant Orders    POCT SARS-COV2 (COVID) with Flu Antigen (Completed)        COVID-19    Cough with exposure to COVID-19 virus  -     POCT SARS-COV2 (COVID) with Flu Antigen    Hypertension, unspecified type    Cough  -     benzonatate (TESSALON) 200 MG capsule; Take 1 capsule (200 mg total) by mouth 3 (three) times daily as needed for Cough.  Dispense: 30 capsule; Refill: 0       Health Maintenance Topics with due status: Not Due       Topic Last Completion Date    Lipid Panel 07/27/2021    Influenza Vaccine Not Due       Procedures     Future Appointments   Date Time Provider Department Center   7/25/2022 10:30 AM CONCEPCIÓN Sanders [facility]   8/17/2022  3[facility]        Follow up if symptoms worsen or fail to improve.     Signature:  CONCEPCIÓN Sanders    Date of encounter: 7/25/22

## 2023-11-18 ENCOUNTER — HOSPITAL ENCOUNTER (EMERGENCY)
Facility: HOSPITAL | Age: 43
Discharge: HOME | End: 2023-11-18
Payer: COMMERCIAL

## 2023-11-18 VITALS
TEMPERATURE: 98 F | BODY MASS INDEX: 39.14 KG/M2 | HEIGHT: 74 IN | WEIGHT: 305 LBS | SYSTOLIC BLOOD PRESSURE: 123 MMHG | RESPIRATION RATE: 18 BRPM | OXYGEN SATURATION: 98 % | HEART RATE: 75 BPM | DIASTOLIC BLOOD PRESSURE: 85 MMHG

## 2023-11-18 DIAGNOSIS — J01.00 ACUTE MAXILLARY SINUSITIS, RECURRENCE NOT SPECIFIED: Primary | ICD-10-CM

## 2023-11-18 DIAGNOSIS — G44.85 PRIMARY STABBING HEADACHE: ICD-10-CM

## 2023-11-18 PROCEDURE — 99284 PR EMERGENCY DEPT VISIT,LEVEL IV: ICD-10-PCS | Mod: ,,, | Performed by: NURSE PRACTITIONER

## 2023-11-18 PROCEDURE — 63600175 PHARM REV CODE 636 W HCPCS: Performed by: NURSE PRACTITIONER

## 2023-11-18 PROCEDURE — 99284 EMERGENCY DEPT VISIT MOD MDM: CPT

## 2023-11-18 PROCEDURE — 99284 EMERGENCY DEPT VISIT MOD MDM: CPT | Mod: ,,, | Performed by: NURSE PRACTITIONER

## 2023-11-18 PROCEDURE — 96372 THER/PROPH/DIAG INJ SC/IM: CPT | Performed by: NURSE PRACTITIONER

## 2023-11-18 RX ORDER — CEFTRIAXONE 500 MG/1
1000 INJECTION, POWDER, FOR SOLUTION INTRAMUSCULAR; INTRAVENOUS
Status: COMPLETED | OUTPATIENT
Start: 2023-11-18 | End: 2023-11-18

## 2023-11-18 RX ORDER — LEVOFLOXACIN 750 MG/1
750 TABLET ORAL DAILY
Qty: 10 TABLET | Refills: 0 | Status: SHIPPED | OUTPATIENT
Start: 2023-11-18 | End: 2023-11-28

## 2023-11-18 RX ORDER — KETOROLAC TROMETHAMINE 30 MG/ML
60 INJECTION, SOLUTION INTRAMUSCULAR; INTRAVENOUS
Status: COMPLETED | OUTPATIENT
Start: 2023-11-18 | End: 2023-11-18

## 2023-11-18 RX ORDER — IPRATROPIUM BROMIDE 21 UG/1
2 SPRAY, METERED NASAL 2 TIMES DAILY
Qty: 30 ML | Refills: 0 | Status: SHIPPED | OUTPATIENT
Start: 2023-11-18 | End: 2023-11-28

## 2023-11-18 RX ADMIN — KETOROLAC TROMETHAMINE 60 MG: 30 INJECTION INTRAMUSCULAR; INTRAVENOUS at 06:11

## 2023-11-18 RX ADMIN — CEFTRIAXONE SODIUM 1000 MG: 500 INJECTION, POWDER, FOR SOLUTION INTRAMUSCULAR; INTRAVENOUS at 06:11

## 2023-11-18 NOTE — ED PROVIDER NOTES
Encounter Date: 11/18/2023       History     Chief Complaint   Patient presents with    Headache    Facial Pain     Pt is a 42 y/o male who presents to ED with C/O severe headache onset last PM after work. Pt states he has had sinus drainage and pain for the past 5-6 days. 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 He describes the HA as one sided (Right) extending from posterior right ear shooting to right eye. Pain is worse with lying down . Pt also has non productive cough. He reports the drainage is yellow/green in color.             Review of patient's allergies indicates:  No Known Allergies  Past Medical History:   Diagnosis Date    Ankle joint pain     Depressive disorder     GERD (gastroesophageal reflux disease)     Hypertension     Sinus tarsi syndrome of left ankle     Vitamin D deficiency      Past Surgical History:   Procedure Laterality Date    ADENOIDECTOMY      TONSILLECTOMY       Family History   Problem Relation Age of Onset    Coronary artery disease Mother     Hypertension Mother     Coronary artery disease Father     Hypertension Father     Cancer Father     Stroke Father      Social History     Tobacco Use    Smoking status: Every Day    Smokeless tobacco: Current     Types: Snuff   Substance Use Topics    Alcohol use: Not Currently    Drug use: Never     Review of Systems   Constitutional:  Negative for fever.   HENT:  Positive for sinus pressure and sinus pain. Negative for sore throat.    Respiratory:  Negative for shortness of breath.    Cardiovascular:  Negative for chest pain.   Gastrointestinal:  Negative for nausea.   Genitourinary:  Negative for dysuria.   Musculoskeletal:  Negative for back pain.   Skin:  Negative for rash.   Neurological:  Negative for weakness.   Hematological:  Does not bruise/bleed easily.       Physical Exam     Initial Vitals [11/18/23 0624]   BP Pulse Resp Temp SpO2   123/85 75 18  97.7 °F (36.5 °C) 98 %      MAP       --         Physical Exam    Constitutional: He appears well-developed and well-nourished.   HENT:   Nose: Right sinus exhibits maxillary sinus tenderness. Right sinus exhibits no frontal sinus tenderness.   Mouth/Throat: Oropharynx is clear and moist and mucous membranes are normal.   Eyes: Conjunctivae and EOM are normal. Pupils are equal, round, and reactive to light. Right eye exhibits no discharge. Left eye exhibits no discharge.   Neck: Neck supple.   Cardiovascular:  Normal rate, regular rhythm and normal pulses.           Pulmonary/Chest: Breath sounds normal.   Abdominal: Abdomen is soft.   Musculoskeletal:         General: Normal range of motion.      Cervical back: Neck supple.     Lymphadenopathy:     He has no cervical adenopathy.   Neurological: He is oriented to person, place, and time.   Skin: Skin is warm, dry and intact.         Medical Screening Exam   See Full Note    ED Course   Procedures  Labs Reviewed - No data to display       Imaging Results    None          Medications   ketorolac injection 60 mg (60 mg Intramuscular Given 11/18/23 0655)   cefTRIAXone injection 1,000 mg (1,000 mg Intramuscular Given 11/18/23 0655)     Medical Decision Making  MDM  Diff Seasonal allergies vs sinusitis vs common cold  HA     Patient presents for emergent evaluation of acute sinus pressure and HA that poses a threat to life and/or bodily function.    In the ED patient found to have acute sinusitis.            Discharge MDM   Patient was managed in the ED with IM toradol and Rocephin the  response to treatment was excelent    Patient was discharged in stable condition.  Detailed return precautions discussed.      Risk  Prescription drug management.                                   Clinical Impression:   Final diagnoses:  [J01.00] Acute maxillary sinusitis, recurrence not specified (Primary)  [G44.85] Primary stabbing headache        ED Disposition Condition    Discharge  Stable          ED Prescriptions       Medication Sig Dispense Start Date End Date Auth. Provider    levoFLOXacin (LEVAQUIN) 750 MG tablet Take 1 tablet (750 mg total) by mouth once daily. for 10 days 10 tablet 11/18/2023 11/28/2023 Marry Murphy FNP    ipratropium (ATROVENT) 21 mcg (0.03 %) nasal spray 2 sprays by Each Nostril route 2 (two) times daily. for 10 days 30 mL 11/18/2023 11/28/2023 Marry Murphy FNP          Follow-up Information    None          Marry Murphy FNP  11/18/23 0652

## 2023-11-18 NOTE — ED TRIAGE NOTES
"Received ambulatory with complain of headache and facial pain related to "sinus infection". Patient voices is currently on a round of antibiotics and steroids for sinus infections. Was feeling better until this morning.  "

## 2023-11-19 ENCOUNTER — TELEPHONE (OUTPATIENT)
Dept: EMERGENCY MEDICINE | Facility: HOSPITAL | Age: 43
End: 2023-11-19
Payer: COMMERCIAL

## 2024-08-03 ENCOUNTER — HOSPITAL ENCOUNTER (EMERGENCY)
Facility: HOSPITAL | Age: 44
Discharge: HOME | End: 2024-08-03
Payer: COMMERCIAL

## 2024-08-03 VITALS
OXYGEN SATURATION: 95 % | TEMPERATURE: 98 F | WEIGHT: 285 LBS | HEART RATE: 82 BPM | HEIGHT: 75 IN | DIASTOLIC BLOOD PRESSURE: 83 MMHG | SYSTOLIC BLOOD PRESSURE: 135 MMHG | RESPIRATION RATE: 20 BRPM | BODY MASS INDEX: 35.43 KG/M2

## 2024-08-03 DIAGNOSIS — M79.605 PAIN OF LEFT LOWER EXTREMITY: ICD-10-CM

## 2024-08-03 DIAGNOSIS — R52 PAIN AGGRAVATED BY PHYSICAL ACTIVITY: Primary | ICD-10-CM

## 2024-08-03 DIAGNOSIS — R60.0 EDEMA LEG: ICD-10-CM

## 2024-08-03 LAB
ANION GAP SERPL CALCULATED.3IONS-SCNC: 17 MMOL/L (ref 7–16)
BASOPHILS # BLD AUTO: 0.04 K/UL (ref 0–0.2)
BASOPHILS NFR BLD AUTO: 0.4 % (ref 0–1)
BUN SERPL-MCNC: 15 MG/DL (ref 7–18)
BUN/CREAT SERPL: 15 (ref 6–20)
CALCIUM SERPL-MCNC: 8.6 MG/DL (ref 8.5–10.1)
CHLORIDE SERPL-SCNC: 107 MMOL/L (ref 98–107)
CO2 SERPL-SCNC: 24 MMOL/L (ref 21–32)
CREAT SERPL-MCNC: 1.03 MG/DL (ref 0.7–1.3)
DIFFERENTIAL METHOD BLD: ABNORMAL
EGFR (NO RACE VARIABLE) (RUSH/TITUS): 92 ML/MIN/1.73M2
EOSINOPHIL # BLD AUTO: 0.47 K/UL (ref 0–0.5)
EOSINOPHIL NFR BLD AUTO: 5.2 % (ref 1–4)
ERYTHROCYTE [DISTWIDTH] IN BLOOD BY AUTOMATED COUNT: 12.5 % (ref 11.5–14.5)
GLUCOSE SERPL-MCNC: 127 MG/DL (ref 74–106)
HCT VFR BLD AUTO: 47.9 % (ref 40–54)
HGB BLD-MCNC: 15.7 G/DL (ref 13.5–18)
LYMPHOCYTES # BLD AUTO: 3.75 K/UL (ref 1–4.8)
LYMPHOCYTES NFR BLD AUTO: 41.6 % (ref 27–41)
MCH RBC QN AUTO: 31.9 PG (ref 27–31)
MCHC RBC AUTO-ENTMCNC: 32.8 G/DL (ref 32–36)
MCV RBC AUTO: 97.4 FL (ref 80–96)
MONOCYTES # BLD AUTO: 0.58 K/UL (ref 0–0.8)
MONOCYTES NFR BLD AUTO: 6.4 % (ref 2–6)
MPC BLD CALC-MCNC: 9.7 FL (ref 9.4–12.4)
NEUTROPHILS # BLD AUTO: 4.17 K/UL (ref 1.8–7.7)
NEUTROPHILS NFR BLD AUTO: 46.4 % (ref 53–65)
PLATELET # BLD AUTO: 275 K/UL (ref 150–400)
POTASSIUM SERPL-SCNC: 3.7 MMOL/L (ref 3.5–5.1)
RBC # BLD AUTO: 4.92 M/UL (ref 4.6–6.2)
SODIUM SERPL-SCNC: 144 MMOL/L (ref 136–145)
URATE SERPL-MCNC: 5.2 MG/DL (ref 3.5–7.2)
WBC # BLD AUTO: 9.01 K/UL (ref 4.5–11)

## 2024-08-03 PROCEDURE — 85025 COMPLETE CBC W/AUTO DIFF WBC: CPT | Performed by: NURSE PRACTITIONER

## 2024-08-03 PROCEDURE — 80048 BASIC METABOLIC PNL TOTAL CA: CPT | Performed by: NURSE PRACTITIONER

## 2024-08-03 PROCEDURE — 99284 EMERGENCY DEPT VISIT MOD MDM: CPT | Mod: 25

## 2024-08-03 PROCEDURE — 99284 EMERGENCY DEPT VISIT MOD MDM: CPT | Mod: ,,, | Performed by: NURSE PRACTITIONER

## 2024-08-03 PROCEDURE — 36415 COLL VENOUS BLD VENIPUNCTURE: CPT | Performed by: NURSE PRACTITIONER

## 2024-08-03 PROCEDURE — 84550 ASSAY OF BLOOD/URIC ACID: CPT | Performed by: NURSE PRACTITIONER

## 2024-08-03 RX ORDER — OMEPRAZOLE 20 MG/1
40 TABLET, DELAYED RELEASE ORAL
COMMUNITY

## 2024-08-07 DIAGNOSIS — M54.41 LOW BACK PAIN WITH RIGHT-SIDED SCIATICA: ICD-10-CM

## 2024-08-07 DIAGNOSIS — M54.42 LOW BACK PAIN WITH LEFT-SIDED SCIATICA: Primary | ICD-10-CM

## 2024-08-08 ENCOUNTER — HOSPITAL ENCOUNTER (OUTPATIENT)
Dept: RADIOLOGY | Facility: HOSPITAL | Age: 44
Discharge: HOME | End: 2024-08-08
Attending: FAMILY MEDICINE
Payer: COMMERCIAL

## 2024-08-08 DIAGNOSIS — L03.90 CELLULITIS: ICD-10-CM

## 2024-08-08 PROCEDURE — 93971 EXTREMITY STUDY: CPT | Mod: TC,LT

## 2024-08-15 ENCOUNTER — CLINICAL SUPPORT (OUTPATIENT)
Dept: REHABILITATION | Facility: HOSPITAL | Age: 44
End: 2024-08-15
Payer: COMMERCIAL

## 2024-08-15 DIAGNOSIS — M54.42 LOW BACK PAIN WITH LEFT-SIDED SCIATICA: ICD-10-CM

## 2024-08-15 DIAGNOSIS — G89.29 CHRONIC BILATERAL LOW BACK PAIN WITH LEFT-SIDED SCIATICA: Primary | ICD-10-CM

## 2024-08-15 DIAGNOSIS — M54.42 CHRONIC BILATERAL LOW BACK PAIN WITH LEFT-SIDED SCIATICA: Primary | ICD-10-CM

## 2024-08-15 DIAGNOSIS — M54.41 LOW BACK PAIN WITH RIGHT-SIDED SCIATICA: ICD-10-CM

## 2024-08-15 PROCEDURE — 97110 THERAPEUTIC EXERCISES: CPT | Mod: PN

## 2024-08-15 PROCEDURE — 97161 PT EVAL LOW COMPLEX 20 MIN: CPT | Mod: PN

## 2024-08-15 NOTE — PLAN OF CARE
[facility]   Physical Therapy Initial Evaluation    Name: Candelario Starks  Clinic Number: [id number]    Therapy Diagnosis:   Encounter Diagnoses   Name Primary?    Low back pain with left-sided sciatica     Low back pain with right-sided sciatica      Physician: Olive Stallings MD    Physician Orders: PT Eval and Treat    Medical Diagnosis from Referral: low back pain with left and right sciatica   Evaluation Date: 8/15/2024  Authorization Period Expiration: 10/15/2024   Plan of Care Expiration: blue cross   Visit # / Visits authorized: 1/ 20     Time In: 1440   Time Out: 1530   Total Appointment Time (timed & untimed codes): 45  minutes    Precautions: Standard    Subjective   Date of onset: pt states  he has been having pain in his low back with aching into both legs . Pt states he has some spinal degeneration . Pt voices increase pain with every thing if he does it too long. 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  Pt states he has been having low back pain for years.  Pt has worked at [email or web address] of 24 years.       History of current condition - Jose R reports: hx below      Medical History:   Past Medical History:   Diagnosis Date    Ankle joint pain     Depressive disorder     GERD (gastroesophageal reflux disease)     Hypertension     Sinus tarsi syndrome of left ankle     Vitamin D deficiency        Surgical History:   Candelario Starks  has a past surgical history that includes Adenoidectomy and Tonsillectomy.    Medications:   Candelario has a current medication list which includes the following prescription(s): amlodipine, gabapentin, hydrocodone-acetaminophen, lisinopril, omeprazole, and omeprazole.    Allergies:   Review of patient's allergies indicates:  No Known Allergies     Imaging, bone scan films:      Prior Therapy: none   Social History:   lives with their spouse  Occupation: [employer]   Prior Level of Function: independent   Current Level of Function: low  back pain .     Pain:  Current 2/10, worst 5/10, best 2/10   Location: bilateral back   Description: Aching, Dull, Throbbing, Grabbing, Tight, and Deep  Aggravating Factors: Sitting, Standing, Bending, Walking, Morning, Extension, Flexing, Lifting, and Getting out of bed/chair  Easing Factors:  constantly moving     Pts goals: be able to work pain free     Objective     Posture:  Standing lordosis: Increased  Sitting lordosis: Decreased  Iliac crest height: left increased  PSIS height: left increased  Pelvic rotation/torsion: Yes  Scoliosis: Yes  Lateral shift: left  Comments:      MANUAL MUSCLE TEST  Right left   Hip flexion    L1,L2 MMT number: 5/5 MMT strength: 5/5   Hip abduction   L4,5 MMT strength: 5/5 MMT strength: 5/5   Knee extension  L3 MMT strength: 5/5 MMT strength: 5/5   Knee flexion   S1 MMT strength: 5/5 MMT strength: 5/5   Ankle dorsiflexion  L4,5 MMT strength: 5/5 MMT strength: 5/5   Ankle plantar flexion  S1 MMT strength: 5/5 MMT strength: 5/5   Extensor hallucis longus L5 MMT strength: 5/5 MMT strength: 5/5     ROM/flexibility right left   Hip flexion (120)   115  95    Internal rotation (45) 30 20   External rotation (45) 39 30   Hamstring 90/90 (-10)  -20 -20                     Special test Right  Left    SLR test < 60 degrees Negative Negative   SLR test > 60 degrees Negative Negative   Sitting slump test Negative Negative   Piriformis test Negative Negative   KRYSTA test Negative Negative   SI forward bend Negative Positive   SI distraction Negative Positive   SI compression Negative Positive     Gait assessment:   Antalgic gait: Yes  Assistive device: none    Spinal mobility:  Segment:      Palpation: pt has pain with palpation of psis , si joint left     Dermatome:      Limitation/Restriction for FOTO lumbar  Survey    Therapist reviewed FOTO scores for Candelario [place] on 8/15/2024.   FOTO documents entered into EPIC - see Media section.    Limitation Score: 58%         TREATMENT         Jose R received the treatments listed below:  THERAPEUTIC EXERCISES to develop strength, endurance, ROM, flexibility, and posture for 20  minutes including home ex program     Home Exercises and Patient Education Provided    Education provided:   - range of motion and strength     Written Home Exercises Provided: Patient instructed to cont prior HEP.  Exercises were reviewed and Jose R was able to demonstrate them prior to the end of the session.  Jose R demonstrated good  understanding of the education provided.     See EMR under Patient Instructions for exercises provided 8/15/2024.    Assessment   Candelario is a 43 y.o. male referred to outpatient Physical Therapy with a medical diagnosis of low back pain with right radicular symptoms . Pt presents  with decreased left hip :  internal rotation , hip flexion , extension     Pt prognosis is Excellent.   Pt will benefit from skilled outpatient Physical Therapy to address the deficits stated above and in the chart below, provide pt/family education, and to maximize pt's level of independence.     Plan of care discussed with patient: Yes  Pt's spiritual, cultural and educational needs considered and patient is agreeable to the plan of care and goals as stated below:     Anticipated Barriers for therapy: with a medical diagnosis of low back pain with right radicular symptoms . Pt presents  with decreased left hip :  internal rotation , hip flexion , extension       Goals:  Short Term Goals: 4 weeks   Pt will be independent with home ex program   Pt will be able to increase bilateral hip flexion to 110 arom   Increase left hip internal rotation to 35 degrees   Decrease worse pain down to 2/10     Long Term Goals: 6  weeks   Pt will be able to increase hip flexion to 120 degrees   Be able to be pain free during 8 hours of work     Plan   Plan of care Certification: 8/15/2024 to 10/15/2024     Outpatient Physical Therapy 2 times weekly for 8 weeks to include the following  interventions: Electrical Stimulation ifc , Neuromuscular Re-ed, Patient Education, Therapeutic Activities, and Therapeutic Exercise. Modalities as needed.     Plan of care has been reestablished with Susy GALINDO, Arlene GALINDO, Amna GALINDO  and Gena GALINDO.      Sadiq Mariscal, PT          I CERTIFY THE NEED FOR THESE SERVICES FURNISHED UNDER THIS PLAN OF TREATMENT AND WHILE UNDER MY CARE.    Physician's comments:      Physician's Signature: ___________________________________________________

## 2024-08-21 ENCOUNTER — CLINICAL SUPPORT (OUTPATIENT)
Dept: REHABILITATION | Facility: HOSPITAL | Age: 44
End: 2024-08-21
Payer: COMMERCIAL

## 2024-08-21 DIAGNOSIS — M25.69 DECREASED RANGE OF MOTION OF TRUNK AND BACK: ICD-10-CM

## 2024-08-21 DIAGNOSIS — M54.41 ACUTE BILATERAL LOW BACK PAIN WITH RIGHT-SIDED SCIATICA: Primary | ICD-10-CM

## 2024-08-21 DIAGNOSIS — M54.42 ACUTE MIDLINE LOW BACK PAIN WITH LEFT-SIDED SCIATICA: ICD-10-CM

## 2024-08-21 PROCEDURE — 97530 THERAPEUTIC ACTIVITIES: CPT | Mod: PN,CQ

## 2024-08-21 PROCEDURE — 97014 ELECTRIC STIMULATION THERAPY: CPT | Mod: PN,CQ

## 2024-08-21 PROCEDURE — 97112 NEUROMUSCULAR REEDUCATION: CPT | Mod: PN,CQ

## 2024-08-21 NOTE — PROGRESS NOTES
Physical Therapy Treatment Note     Name: Candelario Starks  Clinic Number: [id number]    Therapy Diagnosis: No diagnosis found.  Physician: Olive Stallings MD    Visit Date: 8/21/2024    Physician Orders: PT Eval and Treat    Medical Diagnosis from Referral: low back pain with left and right sciatica   Evaluation Date: 8/15/2024  Authorization Period Expiration: 10/15/2024   Plan of Care Expiration: blue cross   Visit # / Visits authorized: 2/ 20   PTA Visit #: 1    Time In: 235  Time Out: 323  Total Billable Time: 48 minutes    Precautions: Standard      Subjective     Pt reports: I was sore, but feeling better.  He was compliant with home exercise program.  Response to previous treatment: sore  Functional change: ongoing    Pain: 2/10  Location: bilateral back/ sciatica       Objective   Range of motion      ROM/flexibility right left   Hip flexion (120)   115  106   Internal rotation (45) 32 22     Jose R participated in neuromuscular re-education activities to improve: Posture for 20 minutes. The following activities were included:  Slant board x 2'  Bilateral hamstrings stretch on step x 5  Swissball knee flexion nerve glides x 10  Swissball bilateral trunk rotation x 10  Hip adduction with bolster x 10  Bilateral single knee to chest x 5  Bilateral piriformis x 5  Side lying left hip internal rotation with red theraband and ball x 10    Jose R participated in dynamic functional therapeutic activities to improve functional performance for 8  minutes, including:  Biking x 5' to increase endurance with walking  Sitting gray swissball trunk flexion rollouts x 10  Sitting gray swissball bilateral lateral trunk flexion rollouts x 10    Jose R received the following direct contact modalities after being cleared for contraindications:     Jose R received the following supervised modalities after being cleared for contradictions: IFC Electrical Stimulation:  Candelario received IFC Electrical Stimulation for pain control  applied to the low back. Pt received stimulation at 100 % scan at a frequency of 15 for 20 minutes. Candelario tolderated treatment well without any adverse effects.      Jose R received hot pack for 20 minutes to low back.    Home Exercises Provided and Patient Education Provided     Education provided: home exercise program     Written Home Exercises Provided: Patient instructed to cont prior HEP.  Exercises were reviewed and Jose R was able to demonstrate them prior to the end of the session.  Jose R demonstrated good  understanding of the education provided.     See EMR under Patient Instructions for exercises provided prior visit.    Assessment     Patient voicing decreased pain 0/10 after treatment, able to perform additional therapeutic activities without difficulty.  Jose R Is progressing well towards his goals.   Pt prognosis is Excellent.     Pt will continue to benefit from skilled outpatient physical therapy to address the deficits listed in the problem list box on initial evaluation, provide pt/family education and to maximize pt's level of independence in the home and community environment.     Pt's spiritual, cultural and educational needs considered and pt agreeable to plan of care and goals.     Anticipated barriers to physical therapy: compliance with home exercise program     Goals:  Short Term Goals: 4 weeks   Pt will be independent with home ex program   Pt will be able to increase bilateral hip flexion to 110 arom   Increase left hip internal rotation to 35 degrees   Decrease worse pain down to 2/10      Long Term Goals: 6  weeks   Pt will be able to increase hip flexion to 120 degrees   Be able to be pain free during 8 hours of work     Plan   Plan of care Certification: 8/15/2024 to 10/15/2024      Outpatient Physical Therapy 2 times weekly for 8 weeks to include the following interventions: Electrical Stimulation ifc , Neuromuscular Re-ed, Patient Education, Therapeutic Activities, and Therapeutic  Exercise. Modalities as needed. Plan of care reviewed with Sadiq Mariscal, PT.      Deepthi Jeter, PTA  8/21/2024

## 2024-08-23 ENCOUNTER — CLINICAL SUPPORT (OUTPATIENT)
Dept: REHABILITATION | Facility: HOSPITAL | Age: 44
End: 2024-08-23
Payer: COMMERCIAL

## 2024-08-23 DIAGNOSIS — M54.42 CHRONIC BILATERAL LOW BACK PAIN WITH LEFT-SIDED SCIATICA: Primary | ICD-10-CM

## 2024-08-23 DIAGNOSIS — G89.29 CHRONIC BILATERAL LOW BACK PAIN WITH LEFT-SIDED SCIATICA: Primary | ICD-10-CM

## 2024-08-23 PROCEDURE — 97014 ELECTRIC STIMULATION THERAPY: CPT | Mod: PN

## 2024-08-23 PROCEDURE — 97530 THERAPEUTIC ACTIVITIES: CPT | Mod: PN

## 2024-08-23 PROCEDURE — 97112 NEUROMUSCULAR REEDUCATION: CPT | Mod: PN

## 2024-08-23 NOTE — PROGRESS NOTES
Physical Therapy Treatment Note     Name: Candelario Starks  Clinic Number: [id number]    Therapy Diagnosis: No diagnosis found.  Physician: Olive Stallings MD    Visit Date: 8/23/2024    Physician Orders: PT Eval and Treat    Medical Diagnosis from Referral: low back pain with left and right sciatica   Evaluation Date: 8/15/2024  Authorization Period Expiration: 10/15/2024   Plan of Care Expiration: blue cross   Visit # / Visits authorized: 3/ 20   PTA Visit #: 1    Time In: 209  Time Out: 259  Total Billable Time: 50 minutes    Precautions: Standard      Subjective     Pt reports: I am feeling a lot better     He was compliant with home exercise program.  Response to previous treatment: sore  Functional change: ongoing    Pain: 2/10  Location: bilateral back/ sciatica       Objective   Range of motion      ROM/flexibility right left   Hip flexion (120)   115  115   Internal rotation (45) 32 22     Jose R participated in neuromuscular re-education activities to improve: Posture for 15 minutes. The following activities were included:  Slant board x 2'  Bilateral hamstrings stretch on step x 5  Swissball knee flexion nerve glides x 10  Swissball bilateral trunk rotation x 10  Hip adduction with bolster x 10  Bilateral single knee to chest x 5  Bilateral piriformis x 5  Side lying left hip internal rotation with red theraband and ball x 10    Jose R participated in dynamic functional therapeutic activities to improve functional performance for 15  minutes, including:  Biking x 5' to increase endurance with walking  Sitting gray swissball trunk flexion rollouts x 10  Sitting gray swissball bilateral lateral trunk flexion rollouts x 10  Double support total gym level 10  squatting ( like at work) 20 reps   Total gym level 10 toe raising x 20 reps         Jose R received the following supervised modalities after being cleared for contradictions: IFC Electrical Stimulation:  Candelario received IFC Electrical Stimulation for  pain control applied to the low back. Pt received stimulation at 100 % scan at a frequency of 15 for 20 minutes. Candelario tolderated treatment well without any adverse effects.      Jose R received hot pack for 20 minutes to low back.    Home Exercises Provided and Patient Education Provided     Education provided: home exercise program     Written Home Exercises Provided: Patient instructed to cont prior HEP.  Exercises were reviewed and Jose R was able to demonstrate them prior to the end of the session.  Jose R demonstrated good  understanding of the education provided.     See EMR under Patient Instructions for exercises provided prior visit.    Assessment     Patient voicing decreased pain 0/10 after treatment, able to perform additional therapeutic activities without difficulty.  Jose R Is progressing well towards his goals.   Pt prognosis is Excellent.     Pt will continue to benefit from skilled outpatient physical therapy to address the deficits listed in the problem list box on initial evaluation, provide pt/family education and to maximize pt's level of independence in the home and community environment.     Pt's spiritual, cultural and educational needs considered and pt agreeable to plan of care and goals.     Anticipated barriers to physical therapy: compliance with home exercise program     Goals:  Short Term Goals: 4 weeks   Pt will be independent with home ex program   Pt will be able to increase bilateral hip flexion to 110 arom   Increase left hip internal rotation to 35 degrees   Decrease worse pain down to 2/10      Long Term Goals: 6  weeks   Pt will be able to increase hip flexion to 120 degrees   Be able to be pain free during 8 hours of work     Plan   Plan of care Certification: 8/15/2024 to 10/15/2024      Outpatient Physical Therapy 2 times weekly for 8 weeks to include the following interventions: Electrical Stimulation ifc , Neuromuscular Re-ed, Patient Education, Therapeutic Activities, and  Therapeutic Exercise. Modalities as needed.      Plan of care has been reestablished with Susy GALINDO and  Arlene GALINDO,       Sadiq Mariscal, PT  8/23/2024

## 2024-09-03 ENCOUNTER — CLINICAL SUPPORT (OUTPATIENT)
Dept: REHABILITATION | Facility: HOSPITAL | Age: 44
End: 2024-09-03
Payer: COMMERCIAL

## 2024-09-03 DIAGNOSIS — G89.29 CHRONIC BILATERAL LOW BACK PAIN WITH LEFT-SIDED SCIATICA: Primary | ICD-10-CM

## 2024-09-03 DIAGNOSIS — M54.42 CHRONIC BILATERAL LOW BACK PAIN WITH LEFT-SIDED SCIATICA: Primary | ICD-10-CM

## 2024-09-03 PROCEDURE — 97014 ELECTRIC STIMULATION THERAPY: CPT | Mod: PN

## 2024-09-03 PROCEDURE — 97530 THERAPEUTIC ACTIVITIES: CPT | Mod: PN

## 2024-09-03 PROCEDURE — 97112 NEUROMUSCULAR REEDUCATION: CPT | Mod: PN

## 2024-09-03 NOTE — PROGRESS NOTES
Physical Therapy Treatment Note     Name: Candelario Starks  Clinic Number: [id number]    Therapy Diagnosis:   Encounter Diagnosis   Name Primary?    Chronic bilateral low back pain with left-sided sciatica Yes     Physician: Olive Stallings MD    Visit Date: 9/3/2024    Physician Orders: PT Eval and Treat    Medical Diagnosis from Referral: low back pain with left and right sciatica   Evaluation Date: 8/15/2024  Authorization Period Expiration: 10/15/2024   Plan of Care Expiration: blue cross   Visit # / Visits authorized: 3/ 20   PTA Visit #: 1    Time In: 209  Time Out: 259  Total Billable Time: 50 minutes    Precautions: Standard      Subjective     Pt reports: I am still stiff     He was compliant with home exercise program.  Response to previous treatment: sore  Functional change: ongoing    Pain: 2/10  Location: bilateral back/ sciatica       Objective   Range of motion      ROM/flexibility right left   Hip flexion (120)   115  115   Internal rotation (45) 32 22     Jose R participated in neuromuscular re-education activities to improve: Posture for 15 minutes. The following activities were included:  Slant board x 2'  Bilateral hamstrings stretch on step x 5  Swissball knee flexion nerve glides x 10  Swissball bilateral trunk rotation x 10  Hip adduction with bolster x 10  Bilateral single knee to chest x 5  Bilateral piriformis x 5  Side lying left hip internal rotation with red theraband and ball x 10    Jose R participated in dynamic functional therapeutic activities to improve functional performance for 15  minutes, including:  Biking x 5' to increase endurance with walking  Sitting gray swissball trunk flexion rollouts x 10  Sitting gray swissball bilateral lateral trunk flexion rollouts x 10  Double support total gym level 10  squatting ( like at work) 20 reps   Total gym level 10 toe raising x 20 reps         Jose R received the following supervised modalities after being cleared for contradictions: IFC  Electrical Stimulation:  Candelario received IFC Electrical Stimulation for pain control applied to the low back. Pt received stimulation at 100 % scan at a frequency of 15 for 20 minutes. Candelario tolderated treatment well without any adverse effects.      Jose R received hot pack for 20 minutes to low back.    Home Exercises Provided and Patient Education Provided     Education provided: home exercise program     Written Home Exercises Provided: Patient instructed to cont prior HEP.  Exercises were reviewed and Jose R was able to demonstrate them prior to the end of the session.  Jose R demonstrated good  understanding of the education provided.     See EMR under Patient Instructions for exercises provided prior visit.    Assessment     Patient voicing decreased pain 0/10 after treatment, able to perform additional therapeutic activities without difficulty.  Jose R Is progressing well towards his goals.   Pt prognosis is Excellent.     Pt will continue to benefit from skilled outpatient physical therapy to address the deficits listed in the problem list box on initial evaluation, provide pt/family education and to maximize pt's level of independence in the home and community environment.     Pt's spiritual, cultural and educational needs considered and pt agreeable to plan of care and goals.     Anticipated barriers to physical therapy: compliance with home exercise program     Goals:  Short Term Goals: 4 weeks   Pt will be independent with home ex program   Pt will be able to increase bilateral hip flexion to 110 arom   Increase left hip internal rotation to 35 degrees   Decrease worse pain down to 2/10      Long Term Goals: 6  weeks   Pt will be able to increase hip flexion to 120 degrees   Be able to be pain free during 8 hours of work     Plan   Plan of care Certification: 8/15/2024 to 10/15/2024      Outpatient Physical Therapy 2 times weekly for 8 weeks to include the following interventions: Electrical Stimulation  ifc , Neuromuscular Re-ed, Patient Education, Therapeutic Activities, and Therapeutic Exercise. Modalities as needed.      Plan of care has been reestablished with Susy GALINDO and  Arlene GALINDO,       Sadiq Mariscal, PT  9/3/2024

## 2025-03-24 ENCOUNTER — OFFICE VISIT (OUTPATIENT)
Dept: FAMILY MEDICINE | Facility: CLINIC | Age: 45
End: 2025-03-24
Payer: COMMERCIAL

## 2025-03-24 ENCOUNTER — HOSPITAL ENCOUNTER (OUTPATIENT)
Dept: RADIOLOGY | Facility: HOSPITAL | Age: 45
Discharge: HOME | End: 2025-03-24
Attending: NURSE PRACTITIONER
Payer: COMMERCIAL

## 2025-03-24 ENCOUNTER — HOSPITAL ENCOUNTER (OUTPATIENT)
Dept: RADIOLOGY | Facility: HOSPITAL | Age: 45
Discharge: HOME | End: 2025-03-24
Attending: ANESTHESIOLOGY
Payer: COMMERCIAL

## 2025-03-24 VITALS
OXYGEN SATURATION: 95 % | BODY MASS INDEX: 34.82 KG/M2 | TEMPERATURE: 97 F | RESPIRATION RATE: 18 BRPM | HEART RATE: 79 BPM | SYSTOLIC BLOOD PRESSURE: 120 MMHG | WEIGHT: 280 LBS | DIASTOLIC BLOOD PRESSURE: 71 MMHG | HEIGHT: 75 IN

## 2025-03-24 DIAGNOSIS — Z12.5 SCREENING FOR PROSTATE CANCER: ICD-10-CM

## 2025-03-24 DIAGNOSIS — R31.0 GROSS HEMATURIA: Primary | ICD-10-CM

## 2025-03-24 DIAGNOSIS — Z87.442 HISTORY OF KIDNEY STONES: ICD-10-CM

## 2025-03-24 DIAGNOSIS — R10.31 RLQ ABDOMINAL PAIN: ICD-10-CM

## 2025-03-24 DIAGNOSIS — M25.579 ANKLE PAIN: ICD-10-CM

## 2025-03-24 DIAGNOSIS — M54.50 ACUTE RIGHT-SIDED LOW BACK PAIN WITHOUT SCIATICA: ICD-10-CM

## 2025-03-24 DIAGNOSIS — R31.0 GROSS HEMATURIA: ICD-10-CM

## 2025-03-24 PROBLEM — G62.9 NEUROPATHY: Status: ACTIVE | Noted: 2024-06-14

## 2025-03-24 PROBLEM — E78.2 MIXED HYPERLIPIDEMIA: Status: ACTIVE | Noted: 2024-06-14

## 2025-03-24 PROBLEM — K21.9 GASTRO-ESOPHAGEAL REFLUX DISEASE WITHOUT ESOPHAGITIS: Status: ACTIVE | Noted: 2025-03-24

## 2025-03-24 LAB
BACTERIA #/AREA URNS HPF: ABNORMAL /HPF
BILIRUB SERPL-MCNC: NEGATIVE MG/DL
BLOOD URINE, POC: ABNORMAL
CAOX CRY UR QL COMP ASSIST: ABNORMAL
CLARITY, UA: ABNORMAL
COLOR, UA: ABNORMAL
GLUCOSE UR QL STRIP: NEGATIVE
KETONES UR QL STRIP: NEGATIVE
LEUKOCYTE ESTERASE URINE, POC: NEGATIVE
MUCOUS, UA: ABNORMAL /LPF
NITRITE, POC UA: NEGATIVE
PH, POC UA: 6
PROTEIN, POC: ABNORMAL
PSA SERPL-MCNC: 0.25 NG/ML
RBC #/AREA URNS HPF: >182 /HPF
SPECIFIC GRAVITY, POC UA: >=1.03
UROBILINOGEN, POC UA: ABNORMAL
WBC #/AREA URNS HPF: 61 /HPF
WBC CLUMPS, UA: ABNORMAL /HPF

## 2025-03-24 PROCEDURE — 96372 THER/PROPH/DIAG INJ SC/IM: CPT | Mod: ,,, | Performed by: NURSE PRACTITIONER

## 2025-03-24 PROCEDURE — 3074F SYST BP LT 130 MM HG: CPT | Mod: ,,, | Performed by: NURSE PRACTITIONER

## 2025-03-24 PROCEDURE — 99214 OFFICE O/P EST MOD 30 MIN: CPT | Mod: 25,,, | Performed by: NURSE PRACTITIONER

## 2025-03-24 PROCEDURE — 74018 RADEX ABDOMEN 1 VIEW: CPT | Mod: TC

## 2025-03-24 PROCEDURE — 74018 RADEX ABDOMEN 1 VIEW: CPT | Mod: 26,,, | Performed by: RADIOLOGY

## 2025-03-24 PROCEDURE — 1160F RVW MEDS BY RX/DR IN RCRD: CPT | Mod: ,,, | Performed by: NURSE PRACTITIONER

## 2025-03-24 PROCEDURE — 81001 URINALYSIS AUTO W/SCOPE: CPT | Mod: ,,, | Performed by: CLINICAL MEDICAL LABORATORY

## 2025-03-24 PROCEDURE — 73610 X-RAY EXAM OF ANKLE: CPT | Mod: TC,LT

## 2025-03-24 PROCEDURE — 3078F DIAST BP <80 MM HG: CPT | Mod: ,,, | Performed by: NURSE PRACTITIONER

## 2025-03-24 PROCEDURE — 87086 URINE CULTURE/COLONY COUNT: CPT | Mod: ,,, | Performed by: CLINICAL MEDICAL LABORATORY

## 2025-03-24 PROCEDURE — G0103 PSA SCREENING: HCPCS | Mod: ,,, | Performed by: CLINICAL MEDICAL LABORATORY

## 2025-03-24 PROCEDURE — 4010F ACE/ARB THERAPY RXD/TAKEN: CPT | Mod: ,,, | Performed by: NURSE PRACTITIONER

## 2025-03-24 PROCEDURE — 1159F MED LIST DOCD IN RCRD: CPT | Mod: ,,, | Performed by: NURSE PRACTITIONER

## 2025-03-24 PROCEDURE — 3008F BODY MASS INDEX DOCD: CPT | Mod: ,,, | Performed by: NURSE PRACTITIONER

## 2025-03-24 RX ORDER — KETOROLAC TROMETHAMINE 30 MG/ML
60 INJECTION, SOLUTION INTRAMUSCULAR; INTRAVENOUS
Status: COMPLETED | OUTPATIENT
Start: 2025-03-24 | End: 2025-03-24

## 2025-03-24 RX ADMIN — KETOROLAC TROMETHAMINE 60 MG: 30 INJECTION, SOLUTION INTRAMUSCULAR; INTRAVENOUS at 02:03

## 2025-03-24 NOTE — PROGRESS NOTES
[facility]    Iris Mariee, AGPCNP-BC [facility] [address] [address]  [phone]          PATIENT NAME: Candelario Starks  : 1980  DATE: 3/24/25  MRN: [id number]          Reason for Visit        Chief Complaint   Patient presents with    Back Pain     Started two days ago, with presence of dark urine x 2 weeks. Reports history of kidney stones.     Abdominal Pain     Right lower abdominal pain.        History of Present Illness      CHIEF COMPLAINT:  Patient presents today with hematuria and back pain concerning for kidney stones.    UROLOGIC SYMPTOMS:  He reports gross hematuria for 2-3 weeks. He has constant dull pain in lower back and stomach that started yesterday, which he notes is less severe compared to previous episodes of kidney stones. He denies pain with urination.    FLUID INTAKE:  He consumes approximately 1 liter of fluid daily that has additives in it for flavor. He expresses difficulty drinking plain water due to an unpleasant oral sensation and prefers flavored water alternatives.    OCCUPATIONAL HISTORY:  He works at [employer] performing heavy lifting throughout the day. He missed work yesterday due to his symptoms.      ROS:  General: -fever, -chills, -fatigue, -weight gain, -weight loss  Eyes: -vision changes, -redness, -discharge  ENT: -ear pain, +nasal congestion, -sore throat  Cardiovascular: -chest pain, -palpitations, -lower extremity edema  Respiratory: -cough, -shortness of breath  Gastrointestinal: +abdominal pain, -nausea, -vomiting, +diarrhea, -constipation, -blood in stool, +mucous in stool  Genitourinary: -dysuria, +hematuria, -frequency  Musculoskeletal: -joint pain, -muscle pain, +back pain  Skin: -rash, -lesion  Neurological: -headache, -dizziness, -numbness, -tingling  Psychiatric: -anxiety, -depression, -sleep difficulty          MEDICAL / SURGICAL / SOCIAL HISTORY      Past Medical History:   Diagnosis Date    Ankle joint pain     Depressive disorder     GERD (gastroesophageal reflux disease)     Hypertension     Sinus tarsi syndrome of left ankle     Vitamin D deficiency        Past Surgical History:   Procedure Laterality Date    ADENOIDECTOMY      TONSILLECTOMY         Social History     Tobacco Use    Smoking status: Every Day     Types: Cigarettes    Smokeless tobacco: Current     Types: Snuff   Substance Use Topics    Alcohol use: Not Currently    Drug use: Never         I personally reviewed all past medical, surgical, and social.     MEDICATIONS / ALLERGIES / HM     Current Outpatient Medications   Medication Sig Dispense Refill    amLODIPine (NORVASC) 5 MG tablet TAKE ONE Tablet BY MOUTH ONCE DAILY 90 tablet 1    gabapentin (NEURONTIN) 400 MG capsule TAKE ONE Capsule BY MOUTH TWICE DAILY 180 capsule 1    HYDROcodone-acetaminophen (NORCO) 7.5-325 mg per tablet Take 1 tablet by mouth 2 (two) times daily as needed.      lisinopriL 10 MG tablet TAKE ONE Tablet BY MOUTH ONCE DAILY 90 tablet 1    omeprazole (PRILOSEC) 40 MG capsule Take 1 capsule (40 mg total) by mouth once daily. 90 capsule 1     No current facility-administered medications for this visit.       Review of patient's allergies indicates:  No Known Allergies      There is no immunization history on file for this patient.     Health Maintenance   Topic Date Due    Hepatitis C Screening  Never done    HIV Screening  Never done    TETANUS VACCINE  Never done    Pneumococcal Vaccines (Age 0-49) (1 of 2 - PCV) Never done    Hemoglobin A1c (Diabetic Prevention Screening)  06/03/2024    Influenza Vaccine (1) Never done    COVID-19 Vaccine (1 - 2024-25 season) Never done    Colorectal Cancer Screening  Never done    Lipid Panel  07/27/2026    RSV Vaccine (Age 60+ and Pregnant patients) (1 - 1-dose 75+ series) 10/25/2054        Physical Exam      Vital Signs  Temp: 97.3 °F (36.3 °C)  Temp Source: Oral  Pulse:  "79  Resp: 18  SpO2: 95 %  BP: 120/71  BP Location: Left arm  Patient Position: Sitting  Pain Score:   4  Pain Loc: Back  Height and Weight  Height: 6' 3" (190.5 cm)  Weight: 127 kg (280 lb)  BSA (Calculated - sq m): 2.59 sq meters  BMI (Calculated): 35  Weight in (lb) to have BMI = 25: 199.6]    Physical Exam  Constitutional:       General: He is not in acute distress.     Appearance: He is obese.   HENT:      Head: Normocephalic and atraumatic.      Right Ear: External ear normal.      Left Ear: External ear normal.   Cardiovascular:      Rate and Rhythm: Normal rate and regular rhythm.      Pulses: Normal pulses.      Heart sounds: Normal heart sounds.   Pulmonary:      Effort: Pulmonary effort is normal.      Breath sounds: Normal breath sounds.   Abdominal:      Palpations: Abdomen is soft.      Tenderness: There is no abdominal tenderness.   Musculoskeletal:      Lumbar back: Tenderness present.        Back:    Skin:     General: Skin is warm and dry.   Neurological:      Mental Status: He is alert and oriented to person, place, and time.   Psychiatric:         Mood and Affect: Mood normal.         Behavior: Behavior normal. Behavior is cooperative.            Laboratory:    Lab Results   Component Value Date     (H) 08/03/2024     08/03/2024    K 3.7 08/03/2024     08/03/2024    CO2 24 08/03/2024    BUN 15 08/03/2024    CREATININE 1.03 08/03/2024    CALCIUM 8.6 08/03/2024    PROT 7.5 02/14/2022    ALBUMIN 3.9 02/14/2022    BILITOT 0.3 02/14/2022    ALKPHOS 96 02/14/2022    AST 14 (L) 02/14/2022    ALT 29 02/14/2022    ANIONGAP 17 (H) 08/03/2024    EGFRNONAA 116 02/14/2022       Lab Results   Component Value Date    WBC 9.01 08/03/2024    RBC 4.92 08/03/2024    HGB 15.7 08/03/2024    HCT 47.9 08/03/2024    MCV 97.4 (H) 08/03/2024    RDW 12.5 08/03/2024     08/03/2024        Lab Results   Component Value Date    CHOL 177 07/27/2021    TRIG 80 07/27/2021    HDL 46 07/27/2021    LDLCALC " "115 07/27/2021       Lab Results   Component Value Date    TSH 0.925 06/03/2021       Lab Results   Component Value Date    HGBA1C 5.2 06/03/2021    ESTIMATEDAVG 87 06/03/2021        No results found for: "ABDKCQND29"    No results found for: "IGURXDZP85NE"    No results found for: "PSA"      Point Of Care Testing:    WBC, UA   Date Value Ref Range Status   03/24/2025 negative  Final     Nitrite, UA   Date Value Ref Range Status   03/24/2025 negative  Final     Urobilinogen, UA   Date Value Ref Range Status   03/24/2025 0.2e.u./dl  Final     Protein, POC   Date Value Ref Range Status   03/24/2025 100mg/dl (A)  Final     pH, UA   Date Value Ref Range Status   03/24/2025 6.0  Final     Spec Grav UA   Date Value Ref Range Status   03/24/2025 >=1.030 (A)  Final     Ketones, UA   Date Value Ref Range Status   03/24/2025 negative  Final     Bilirubin, POC   Date Value Ref Range Status   03/24/2025 negative  Final     Glucose, UA   Date Value Ref Range Status   03/24/2025 negative  Final       No results found for: "[id number]", "FLUAMOLEC", "FLUBMOLEC", "MOLSTREPAPOC"    Assessment/Plan     Gross hematuria  -     POCT URINALYSIS W/O SCOPE brown, cloudy, large blood, and protein.  -     Urinalysis, Microscopic  -     X-Ray KUB; Future; Expected date: 03/24/2025    Screening for prostate cancer  -     PSA, Screening; Future; Expected date: 03/24/2025    History of kidney stones  -     X-Ray KUB; Future; Expected date: 03/24/2025    Acute right-sided low back pain without sciatica  -     ketorolac injection 60 mg    RLQ abdominal pain  -     X-Ray KUB; Future; Expected date: 03/24/2025      Assessment & Plan      IMPRESSION:  - Suspect kidney stones based on symptoms and presence of blood in urine, despite absence of bacteria.  - Considered prostate issues as differential diagnosis, given age and symptoms.    GROSS HEMATURIA:  - Instructed the patient to use provided strainer to strain urine.  - Ordered urinalysis.  - " Noted large amount of blood and protein in urine, with no bacteria or white blood cells.  - Advised the patient to increase water intake.    URINARY CALCULUS:  - Patient reports dark urine for 2-3 weeks, with initial absence of pain but recent pain similar to kidney stones.  - Ordered XR Kidney, Ureter, Bladder (KUB) to visualize potential kidney stones, to be performed at hospital outpatient radiology.  - Office will contact the patient when XR Kidney, Ureter, Bladder results are available.  - Patient reports pain similar to previous kidney stone experiences, but less severe.  - Noted constant dull pain in lower back and stomach reported by the patient.  - Suspect potential kidney stones based on symptoms and urinalysis results.    EMPLOYMENT-RELATED:  - Noted that the patient reports working at [employer], job does require lifting of heavy objected.     FOLLOW-UP:  - Instructed the patient to follow up if symptoms persist, worsen, or new symptoms develop.          Future Appointments   Date Time Provider Department Center   3/24/2025  3:40 PM Iris Mariee NP [facility] [employer]       Workup results were reviewed and all questions were answered. Diagnosis and treatment options were discussed and the patient  is amenable with the overall treatment plan. Verbal and written discharge instructions were given including to return to clinic/ED with any acute worsening of symptoms or failure of symptoms to improve. The reasons for return to the clinic/ED were explained in lay terms. No further intervention is warranted at this time. The patient agrees with the plan, expresses understanding, is hemodynamically stable and in no acute distress.     All questions answered to desired level of satisfaction    This note was generated with the assistance of ambient listening technology. Verbal consent was obtained by the patient and accompanying visitor(s) for the recording of patient appointment to facilitate this note. I  attest to having reviewed and edited the generated note for accuracy, though some syntax or spelling errors may persist. Please contact the author of this note for any clarification.             LION Madera-BC  [facility]

## 2025-03-25 ENCOUNTER — RESULTS FOLLOW-UP (OUTPATIENT)
Dept: FAMILY MEDICINE | Facility: CLINIC | Age: 45
End: 2025-03-25
Payer: COMMERCIAL

## 2025-03-25 DIAGNOSIS — R31.9 URINARY TRACT INFECTION WITH HEMATURIA, SITE UNSPECIFIED: Primary | ICD-10-CM

## 2025-03-25 DIAGNOSIS — N39.0 URINARY TRACT INFECTION WITH HEMATURIA, SITE UNSPECIFIED: Primary | ICD-10-CM

## 2025-03-25 RX ORDER — SULFAMETHOXAZOLE AND TRIMETHOPRIM 800; 160 MG/1; MG/1
1 TABLET ORAL 2 TIMES DAILY
Qty: 6 TABLET | Refills: 0 | Status: SHIPPED | OUTPATIENT
Start: 2025-03-25 | End: 2025-03-28

## 2025-03-26 LAB — UA COMPLETE W REFLEX CULTURE PNL UR: NO GROWTH

## 2025-04-28 ENCOUNTER — OFFICE VISIT (OUTPATIENT)
Dept: FAMILY MEDICINE | Facility: CLINIC | Age: 45
End: 2025-04-28
Payer: COMMERCIAL

## 2025-04-28 VITALS
RESPIRATION RATE: 18 BRPM | TEMPERATURE: 98 F | SYSTOLIC BLOOD PRESSURE: 115 MMHG | DIASTOLIC BLOOD PRESSURE: 73 MMHG | WEIGHT: 279.19 LBS | HEIGHT: 75 IN | BODY MASS INDEX: 34.71 KG/M2 | OXYGEN SATURATION: 96 % | HEART RATE: 83 BPM

## 2025-04-28 DIAGNOSIS — R31.0 GROSS HEMATURIA: ICD-10-CM

## 2025-04-28 DIAGNOSIS — R35.0 URINARY FREQUENCY: Primary | ICD-10-CM

## 2025-04-28 DIAGNOSIS — Z87.442 HISTORY OF KIDNEY STONES: ICD-10-CM

## 2025-04-28 PROBLEM — K02.9 DENTAL CARIES: Status: ACTIVE | Noted: 2024-11-13

## 2025-04-28 PROBLEM — M25.579 ACUTE ANKLE PAIN: Status: ACTIVE | Noted: 2024-09-26

## 2025-04-28 LAB
BACTERIA #/AREA URNS HPF: ABNORMAL /HPF
BILIRUB SERPL-MCNC: NEGATIVE MG/DL
BILIRUB UR QL STRIP: NEGATIVE
BLOOD URINE, POC: ABNORMAL
CLARITY UR: CLEAR
CLARITY, UA: CLEAR
COLOR UR: YELLOW
COLOR, UA: YELLOW
GLUCOSE UR QL STRIP: NEGATIVE
GLUCOSE UR STRIP-MCNC: NORMAL MG/DL
HYALINE CASTS #/AREA URNS LPF: ABNORMAL /LPF
KETONES UR QL STRIP: NEGATIVE
KETONES UR STRIP-SCNC: NEGATIVE MG/DL
LEUKOCYTE ESTERASE UR QL STRIP: NEGATIVE
LEUKOCYTE ESTERASE URINE, POC: NEGATIVE
MUCOUS, UA: ABNORMAL /LPF
NITRITE UR QL STRIP: NEGATIVE
NITRITE, POC UA: NEGATIVE
PH UR STRIP: 6 PH UNITS
PH, POC UA: 6
PROT UR QL STRIP: 20
PROTEIN, POC: ABNORMAL
RBC # UR STRIP: ABNORMAL /UL
RBC #/AREA URNS HPF: 25 /HPF
SP GR UR STRIP: 1.02
SPECIFIC GRAVITY, POC UA: >=1.03
SQUAMOUS #/AREA URNS LPF: ABNORMAL /HPF
UROBILINOGEN UR STRIP-ACNC: NORMAL MG/DL
UROBILINOGEN, POC UA: ABNORMAL
WBC #/AREA URNS HPF: 6 /HPF

## 2025-04-28 PROCEDURE — 3078F DIAST BP <80 MM HG: CPT | Mod: ,,, | Performed by: NURSE PRACTITIONER

## 2025-04-28 PROCEDURE — 3008F BODY MASS INDEX DOCD: CPT | Mod: ,,, | Performed by: NURSE PRACTITIONER

## 2025-04-28 PROCEDURE — 1160F RVW MEDS BY RX/DR IN RCRD: CPT | Mod: ,,, | Performed by: NURSE PRACTITIONER

## 2025-04-28 PROCEDURE — 1159F MED LIST DOCD IN RCRD: CPT | Mod: ,,, | Performed by: NURSE PRACTITIONER

## 2025-04-28 PROCEDURE — 81001 URINALYSIS AUTO W/SCOPE: CPT | Mod: ,,, | Performed by: CLINICAL MEDICAL LABORATORY

## 2025-04-28 PROCEDURE — 96372 THER/PROPH/DIAG INJ SC/IM: CPT | Mod: ,,, | Performed by: NURSE PRACTITIONER

## 2025-04-28 PROCEDURE — 4010F ACE/ARB THERAPY RXD/TAKEN: CPT | Mod: ,,, | Performed by: NURSE PRACTITIONER

## 2025-04-28 PROCEDURE — 99213 OFFICE O/P EST LOW 20 MIN: CPT | Mod: 25,,, | Performed by: NURSE PRACTITIONER

## 2025-04-28 PROCEDURE — 3074F SYST BP LT 130 MM HG: CPT | Mod: ,,, | Performed by: NURSE PRACTITIONER

## 2025-04-28 RX ORDER — CEFTRIAXONE 1 G/1
1 INJECTION, POWDER, FOR SOLUTION INTRAMUSCULAR; INTRAVENOUS
Status: COMPLETED | OUTPATIENT
Start: 2025-04-28 | End: 2025-04-28

## 2025-04-28 RX ORDER — GABAPENTIN 300 MG/1
900 CAPSULE ORAL 3 TIMES DAILY
COMMUNITY

## 2025-04-28 RX ORDER — AMOXICILLIN 500 MG/1
500 CAPSULE ORAL 3 TIMES DAILY
COMMUNITY
Start: 2025-04-24

## 2025-04-28 RX ADMIN — CEFTRIAXONE 1 G: 1 INJECTION, POWDER, FOR SOLUTION INTRAMUSCULAR; INTRAVENOUS at 03:04

## 2025-04-28 NOTE — PROGRESS NOTES
[facility]    Iris Mariee, AGPCNP-BC [facility] [address] [place] [address]  [phone]          PATIENT NAME: Candelario Starks  : 1980  DATE: 25  MRN: [id number]          Reason for Visit        Chief Complaint   Patient presents with    Urinary Tract Infection     Pt presents with with low back pain and burning with urination. Pt reports there does not seem to be blood in his urine. Pt reports frequency of urination. Pt reports symptoms started Thursday or Friday        History of Present Illness   CHIEF COMPLAINT:  Patient presents today with urinary symptoms and back pain.    URINARY SYMPTOMS:  He reports dysuria, particularly notable at the end of urination, along with increased frequency and urgency. He is experiencing difficulty with urinary retention at work. He denies penile discharge.    MUSCULOSKELETAL:  He reports bilateral lower back pain that began Thursday or Friday.    MEDICAL HISTORY:  He has a history of kidney stones.    MEDICATIONS:  He is currently taking amoxicillin in preparation for upcoming dental extraction of upper teeth.    ALLERGIES:  He denies any medication allergies.    SOCIAL HISTORY:  He drinks 1-2 L of water daily.      ROS:  General: -fever, -chills, +fatigue, -weight gain, -weight loss  Eyes: -vision changes, -redness, -discharge  ENT: -ear pain, -nasal congestion, -sore throat  Cardiovascular: -chest pain, -palpitations, -lower extremity edema  Respiratory: -cough, -shortness of breath  Gastrointestinal: -abdominal pain, -nausea, -vomiting, -diarrhea, -constipation, -blood in stool  Genitourinary: +dysuria, +hematuria, +frequency, +difficulty urinating, +urgency  Musculoskeletal: -joint pain, -muscle pain, +back pain  Skin: -rash, -lesion  Neurological: -headache, -dizziness, -numbness, -tingling  Psychiatric: -anxiety, -depression, -sleep difficulty          MEDICAL  / SURGICAL / SOCIAL HISTORY     Past Medical History:   Diagnosis Date    Ankle joint pain     Depressive disorder     GERD (gastroesophageal reflux disease)     Hypertension     Sinus tarsi syndrome of left ankle     Vitamin D deficiency        Past Surgical History:   Procedure Laterality Date    ADENOIDECTOMY      TONSILLECTOMY         Social History     Tobacco Use    Smoking status: Every Day     Types: Cigarettes    Smokeless tobacco: Current     Types: Snuff   Substance Use Topics    Alcohol use: Not Currently    Drug use: Never     I personally reviewed all past medical, surgical, and social.     MEDICATIONS / ALLERGIES / HM     Current Outpatient Medications   Medication Sig Dispense Refill    amLODIPine (NORVASC) 5 MG tablet TAKE ONE Tablet BY MOUTH ONCE DAILY 90 tablet 1    amoxicillin (AMOXIL) 500 MG capsule Take 500 mg by mouth 3 (three) times daily.      gabapentin (NEURONTIN) 300 MG capsule Take 900 mg by mouth 3 (three) times daily.      HYDROcodone-acetaminophen (NORCO) 7.5-325 mg per tablet Take 1 tablet by mouth 2 (two) times daily as needed.      lisinopriL 10 MG tablet TAKE ONE Tablet BY MOUTH ONCE DAILY 90 tablet 1    omeprazole (PRILOSEC) 40 MG capsule Take 1 capsule (40 mg total) by mouth once daily. 90 capsule 1     No current facility-administered medications for this visit.       Review of patient's allergies indicates:  No Known Allergies      There is no immunization history on file for this patient.     Health Maintenance   Topic Date Due    Hepatitis C Screening  Never done    HIV Screening  Never done    TETANUS VACCINE  Never done    Pneumococcal Vaccines (Age 0-49) (1 of 2 - PCV) Never done    Hemoglobin A1c (Diabetic Prevention Screening)  06/03/2024    Influenza Vaccine (1) Never done    COVID-19 Vaccine (1 - 2024-25 season) Never done    Colorectal Cancer Screening  Never done    Lipid Panel  07/27/2026    RSV Vaccine (Age 60+ and Pregnant patients) (1 - 1-dose 75+ series)  "10/25/2054        Physical Exam      Vital Signs  Temp: 97.8 °F (36.6 °C)  Temp Source: Oral  Pulse: 83  Resp: 18  SpO2: 96 %  BP: 115/73  Pain Score:   4  Pain Loc: Back  Height and Weight  Height: 6' 3" (190.5 cm)  Weight: 126.6 kg (279 lb 3.2 oz)  BSA (Calculated - sq m): 2.59 sq meters  BMI (Calculated): 34.9  Weight in (lb) to have BMI = 25: 199.6]    Physical Exam  Constitutional:       General: He is not in acute distress.     Appearance: He is obese.   Cardiovascular:      Rate and Rhythm: Normal rate and regular rhythm.      Pulses: Normal pulses.      Heart sounds: Normal heart sounds.   Pulmonary:      Effort: Pulmonary effort is normal.      Breath sounds: Normal breath sounds.   Abdominal:      Palpations: Abdomen is soft.      Tenderness: There is no abdominal tenderness. There is no right CVA tenderness or left CVA tenderness.   Musculoskeletal:        Back:    Skin:     General: Skin is warm and dry.   Neurological:      Mental Status: He is alert and oriented to person, place, and time.   Psychiatric:         Mood and Affect: Mood normal.         Behavior: Behavior normal. Behavior is cooperative.      Laboratory:    Lab Results   Component Value Date     (H) 08/03/2024     08/03/2024    K 3.7 08/03/2024     08/03/2024    CO2 24 08/03/2024    BUN 15 08/03/2024    CREATININE 1.03 08/03/2024    CALCIUM 8.6 08/03/2024    PROT 7.5 02/14/2022    ALBUMIN 3.9 02/14/2022    BILITOT 0.3 02/14/2022    ALKPHOS 96 02/14/2022    AST 14 (L) 02/14/2022    ALT 29 02/14/2022    ANIONGAP 17 (H) 08/03/2024    EGFRNONAA 116 02/14/2022       Lab Results   Component Value Date    WBC 9.01 08/03/2024    RBC 4.92 08/03/2024    HGB 15.7 08/03/2024    HCT 47.9 08/03/2024    MCV 97.4 (H) 08/03/2024    RDW 12.5 08/03/2024     08/03/2024        Lab Results   Component Value Date    CHOL 177 07/27/2021    TRIG 80 07/27/2021    HDL 46 07/27/2021    LDLCALC 115 07/27/2021       Lab Results   Component " "Value Date    TSH 0.925 06/03/2021       Lab Results   Component Value Date    HGBA1C 5.2 06/03/2021    ESTIMATEDAVG 87 06/03/2021        No results found for: "LTOYLOWM19"    No results found for: "KEEBGVNX57CT"    Lab Results   Component Value Date    PSA 0.249 03/24/2025         Point Of Care Testing:    WBC, UA   Date Value Ref Range Status   04/28/2025 negative  Final     Nitrite, UA   Date Value Ref Range Status   04/28/2025 negative  Final     Urobilinogen, UA   Date Value Ref Range Status   04/28/2025 0.2e.u./dl  Final     Protein, POC   Date Value Ref Range Status   04/28/2025 30mg/dl (A)  Final     pH, UA   Date Value Ref Range Status   04/28/2025 6.0  Final     Spec Grav UA   Date Value Ref Range Status   04/28/2025 >=1.030  Final     Ketones, UA   Date Value Ref Range Status   04/28/2025 negative  Final     Bilirubin, POC   Date Value Ref Range Status   04/28/2025 negative  Final     Glucose, UA   Date Value Ref Range Status   04/28/2025 negative  Final       No results found for: "[id number]", "FLUAMOLEC", "FLUBMOLEC", "MOLSTREPAPOC"    Assessment/Plan     Urinary frequency  -     POCT URINALYSIS W/O SCOPE  -     cefTRIAXone injection 1 g  -     Urinalysis, Reflex to Urine Culture; Future; Expected date: 04/28/2025    Gross hematuria  -     CT Renal Stone Study ABD Pelvis WO; Future; Expected date: 04/28/2025  -     Urinalysis, Reflex to Urine Culture; Future; Expected date: 04/28/2025    History of kidney stones  -     CT Renal Stone Study ABD Pelvis WO; Future; Expected date: 04/28/2025        Assessment & Plan      IMPRESSION:  - Assessed urinary symptoms and back pain.  - Reviewed previous urine culture and KUB results, which were negative for infection and visible stones.  - Considered kidney stones as possible cause despite negative KUB, given patient's history.  - Discussed the potential for STI, but patient denied possibility based on patient's relationship status.  - Evaluated prostate as " potential cause, noting previous normal PSA.  - Evaluated patient's complaint of increased urinary frequency, inability to hold urine at work, slowing of urine flow, and pain during urination.  - Urine test shows moderate blood, microscopic and culture pending.   - Administered rocephin 1000 mg injection during the visit.  - Recommend pushing fluids for symptom relief  - Explained that burning sensation at tip of penis can sometimes indicate a sexually transmitted infection.  - Patient reports bilateral lower back pain starting Thursday or Friday.  - Noted patient's history of kidney stones and considered possibility of current kidney stones as cause of symptoms.  - Ordered CT Abdomen to further investigate.  - Follow up if symptoms persist, worsen, or new symptoms develop.  - Since this is the 2nd month in a row with these symptoms; will consider urology referral based on CT results.        No future appointments.    Workup results were reviewed and all questions were answered. Diagnosis and treatment options were discussed and the patient  is amenable with the overall treatment plan. Verbal and written discharge instructions were given including to return to clinic/ED with any acute worsening of symptoms or failure of symptoms to improve. The reasons for return to the clinic/ED were explained in lay terms. No further intervention is warranted at this time. The patient agrees with the plan, expresses understanding, is hemodynamically stable and in no acute distress.     All questions answered to desired level of satisfaction    This note was generated with the assistance of ambient listening technology. Verbal consent was obtained by the patient and accompanying visitor(s) for the recording of patient appointment to facilitate this note. I attest to having reviewed and edited the generated note for accuracy, though some syntax or spelling errors may persist. Please contact the author of this note for any  clarification.             LION Madera-BC [facility]

## 2025-04-29 ENCOUNTER — HOSPITAL ENCOUNTER (EMERGENCY)
Facility: HOSPITAL | Age: 45
Discharge: HOME | End: 2025-04-29
Payer: COMMERCIAL

## 2025-04-29 VITALS
TEMPERATURE: 98 F | HEART RATE: 75 BPM | OXYGEN SATURATION: 97 % | DIASTOLIC BLOOD PRESSURE: 89 MMHG | RESPIRATION RATE: 16 BRPM | BODY MASS INDEX: 34.69 KG/M2 | WEIGHT: 279 LBS | HEIGHT: 75 IN | SYSTOLIC BLOOD PRESSURE: 147 MMHG

## 2025-04-29 DIAGNOSIS — N20.1 CALCULUS OF URETER: Primary | ICD-10-CM

## 2025-04-29 DIAGNOSIS — R10.9 FLANK PAIN: ICD-10-CM

## 2025-04-29 LAB
ALBUMIN SERPL BCP-MCNC: 4.2 G/DL (ref 3.5–5)
ALBUMIN/GLOB SERPL: 1.2 {RATIO}
ALP SERPL-CCNC: 86 U/L (ref 40–150)
ALT SERPL W P-5'-P-CCNC: 22 U/L
ANION GAP SERPL CALCULATED.3IONS-SCNC: 15 MMOL/L (ref 7–16)
AST SERPL W P-5'-P-CCNC: 39 U/L (ref 11–45)
BACTERIA #/AREA URNS HPF: ABNORMAL /HPF
BASOPHILS # BLD AUTO: 0.05 K/UL (ref 0–0.2)
BASOPHILS NFR BLD AUTO: 0.6 % (ref 0–1)
BILIRUB SERPL-MCNC: 0.6 MG/DL
BILIRUB UR QL STRIP: NEGATIVE
BUN SERPL-MCNC: 14 MG/DL (ref 9–21)
BUN/CREAT SERPL: 15 (ref 6–20)
CALCIUM SERPL-MCNC: 9.8 MG/DL (ref 8.4–10.2)
CHLORIDE SERPL-SCNC: 106 MMOL/L (ref 98–107)
CLARITY UR: CLEAR
CO2 SERPL-SCNC: 24 MMOL/L (ref 22–29)
COLOR UR: YELLOW
CREAT SERPL-MCNC: 0.91 MG/DL (ref 0.72–1.25)
DIFFERENTIAL METHOD BLD: ABNORMAL
EGFR (NO RACE VARIABLE) (RUSH/TITUS): 106 ML/MIN/1.73M2
EOSINOPHIL # BLD AUTO: 0.34 K/UL (ref 0–0.5)
EOSINOPHIL NFR BLD AUTO: 4.2 % (ref 1–4)
ERYTHROCYTE [DISTWIDTH] IN BLOOD BY AUTOMATED COUNT: 12.7 % (ref 11.5–14.5)
GLOBULIN SER-MCNC: 3.6 G/DL (ref 2–4)
GLUCOSE SERPL-MCNC: 113 MG/DL (ref 74–100)
GLUCOSE UR STRIP-MCNC: NEGATIVE MG/DL
HCT VFR BLD AUTO: 50.4 % (ref 40–54)
HGB BLD-MCNC: 16.9 G/DL (ref 13.5–18)
KETONES UR STRIP-SCNC: NEGATIVE MG/DL
LEUKOCYTE ESTERASE UR QL STRIP: NEGATIVE
LYMPHOCYTES # BLD AUTO: 2.68 K/UL (ref 1–4.8)
LYMPHOCYTES NFR BLD AUTO: 33.5 % (ref 27–41)
MCH RBC QN AUTO: 32.4 PG (ref 27–31)
MCHC RBC AUTO-ENTMCNC: 33.5 G/DL (ref 32–36)
MCV RBC AUTO: 96.7 FL (ref 80–96)
MONOCYTES # BLD AUTO: 0.65 K/UL (ref 0–0.8)
MONOCYTES NFR BLD AUTO: 8.1 % (ref 2–6)
MPC BLD CALC-MCNC: 9.9 FL (ref 9.4–12.4)
MUCOUS THREADS #/AREA URNS HPF: ABNORMAL /HPF
NEUTROPHILS # BLD AUTO: 4.29 K/UL (ref 1.8–7.7)
NEUTROPHILS NFR BLD AUTO: 53.6 % (ref 53–65)
NITRITE UR QL STRIP: NEGATIVE
PH UR STRIP: 6 PH UNITS
PLATELET # BLD AUTO: 258 K/UL (ref 150–400)
POTASSIUM SERPL-SCNC: 4.2 MMOL/L (ref 3.5–5.1)
PROT SERPL-MCNC: 7.8 G/DL (ref 6.4–8.3)
PROT UR QL STRIP: 30
RBC # BLD AUTO: 5.21 M/UL (ref 4.6–6.2)
RBC # UR STRIP: ABNORMAL /UL
RBC #/AREA URNS HPF: ABNORMAL /HPF
SODIUM SERPL-SCNC: 141 MMOL/L (ref 136–145)
SP GR UR STRIP: 1.02
SQUAMOUS #/AREA URNS LPF: ABNORMAL /LPF
UROBILINOGEN UR STRIP-ACNC: 0.2 MG/DL
WBC # BLD AUTO: 8.01 K/UL (ref 4.5–11)
WBC #/AREA URNS HPF: ABNORMAL /HPF

## 2025-04-29 PROCEDURE — 25000003 PHARM REV CODE 250: Performed by: NURSE PRACTITIONER

## 2025-04-29 PROCEDURE — 63600175 PHARM REV CODE 636 W HCPCS: Mod: JZ,TB | Performed by: NURSE PRACTITIONER

## 2025-04-29 PROCEDURE — 81003 URINALYSIS AUTO W/O SCOPE: CPT | Performed by: NURSE PRACTITIONER

## 2025-04-29 PROCEDURE — 85025 COMPLETE CBC W/AUTO DIFF WBC: CPT | Performed by: NURSE PRACTITIONER

## 2025-04-29 PROCEDURE — 99284 EMERGENCY DEPT VISIT MOD MDM: CPT | Mod: ,,, | Performed by: NURSE PRACTITIONER

## 2025-04-29 PROCEDURE — 96374 THER/PROPH/DIAG INJ IV PUSH: CPT

## 2025-04-29 PROCEDURE — 96361 HYDRATE IV INFUSION ADD-ON: CPT

## 2025-04-29 PROCEDURE — 99285 EMERGENCY DEPT VISIT HI MDM: CPT | Mod: 25

## 2025-04-29 PROCEDURE — 80053 COMPREHEN METABOLIC PANEL: CPT | Performed by: NURSE PRACTITIONER

## 2025-04-29 RX ORDER — KETOROLAC TROMETHAMINE 30 MG/ML
15 INJECTION, SOLUTION INTRAMUSCULAR; INTRAVENOUS
Status: COMPLETED | OUTPATIENT
Start: 2025-04-29 | End: 2025-04-29

## 2025-04-29 RX ORDER — KETOROLAC TROMETHAMINE 10 MG/1
10 TABLET, FILM COATED ORAL EVERY 6 HOURS PRN
Qty: 12 TABLET | Refills: 0 | Status: SHIPPED | OUTPATIENT
Start: 2025-04-29

## 2025-04-29 RX ORDER — ONDANSETRON 4 MG/1
4 TABLET, FILM COATED ORAL EVERY 6 HOURS PRN
Qty: 12 TABLET | Refills: 0 | Status: SHIPPED | OUTPATIENT
Start: 2025-04-29

## 2025-04-29 RX ORDER — TAMSULOSIN HYDROCHLORIDE 0.4 MG/1
0.4 CAPSULE ORAL DAILY
Qty: 10 CAPSULE | Refills: 0 | Status: SHIPPED | OUTPATIENT
Start: 2025-04-29 | End: 2026-04-29

## 2025-04-29 RX ADMIN — KETOROLAC TROMETHAMINE 15 MG: 30 INJECTION, SOLUTION INTRAMUSCULAR at 05:04

## 2025-04-29 RX ADMIN — SODIUM CHLORIDE 1000 ML: 9 INJECTION, SOLUTION INTRAVENOUS at 05:04

## 2025-04-29 NOTE — Clinical Note
"Candelario Starks (Kevin) was seen and treated in our emergency department on 4/29/2025.  He may return to work on 05/01/2025.       If you have any questions or concerns, please don't hesitate to call.      Katy Mariee, CONCEPCIÓN"

## 2025-04-29 NOTE — ED PROVIDER NOTES
Encounter Date: 4/29/2025       History     Chief Complaint   Patient presents with    Back Pain     PT C/O LOWER BACK PAIN THIS MORNING AND IS COMPLAINING OF URINARY FREQUENCY AND A DYSURIA . PT STATES HIS SYMPTOMS BEGAN THIS PAST FRIDAY AND HAVE CONTINUED TO GROW WORSE. PT STATES HE HAS HAD KIDNEY STONES IN THE PAST AND IS CONCERNED HE MAY HAVE ANOTHER ONE CAUSING HIS SYMPTOMS. PT SEEKS EVALUATION IN THE ED.    Urinary Frequency    Dysuria     43 y/o male with PMHx of HTN, GERD and Kidney stones arrived to the ED via POV with complaint of dysuria, right flank pain that radiates to lower abdomen and into right testicle that started on Friday. Describes pain as dull, Pain is intermittent, but worse today. Rates pain 6/10. Denies fever, nausea or vomiting. Took Norco at 02:30 this am with some relief.   Patient was seen in clinic yesterday for same symptoms. Had UA that revealed small blood and occasional bacteria. He was given a Rocephin 1 gm in clinic and awaiting CT to be scheduled.     The history is provided by the patient.     Review of patient's allergies indicates:  No Known Allergies  Past Medical History:   Diagnosis Date    Ankle joint pain     Depressive disorder     GERD (gastroesophageal reflux disease)     Hypertension     Sinus tarsi syndrome of left ankle     Vitamin D deficiency      Past Surgical History:   Procedure Laterality Date    ADENOIDECTOMY      TONSILLECTOMY       Family History   Problem Relation Name Age of Onset    Coronary artery disease Mother      Hypertension Mother      Coronary artery disease Father      Hypertension Father      Cancer Father      Stroke Father       Social History[1]  Review of Systems   Constitutional:  Negative for activity change, appetite change, fatigue and fever.   Respiratory:  Negative for shortness of breath.    Gastrointestinal:  Positive for abdominal pain. Negative for abdominal distention, constipation, diarrhea, nausea and vomiting.    Genitourinary:  Positive for dysuria, flank pain (right), frequency, hematuria and testicular pain. Negative for decreased urine volume, difficulty urinating, genital sores, penile discharge and penile pain.   Musculoskeletal:  Positive for back pain. Negative for arthralgias, gait problem and myalgias.   All other systems reviewed and are negative.      Physical Exam     Initial Vitals [04/29/25 0533]   BP Pulse Resp Temp SpO2   (!) 147/89 75 16 97.7 °F (36.5 °C) 97 %      MAP       --         Physical Exam    Nursing note and vitals reviewed.  Constitutional: Vital signs are normal. He appears well-developed and well-nourished. He is Obese . He is cooperative. He does not appear ill. No distress.   HENT:   Head: Normocephalic and atraumatic. Mouth/Throat: Mucous membranes are normal.   Cardiovascular:  Normal rate, regular rhythm and normal heart sounds.           Pulmonary/Chest: Effort normal and breath sounds normal.   Abdominal: Abdomen is soft. Bowel sounds are normal. There is abdominal tenderness in the right upper quadrant, right lower quadrant and suprapubic area.   Right flank pain radiates into right lower quad across lower abdomen/supra pubic and into right testicle.    There is right CVA tenderness.  No left CVA tenderness. There is rebound. There is no guarding, no tenderness at McBurney's point and negative Solorzano's sign.   Musculoskeletal:         General: Normal range of motion.     Neurological: He is alert and oriented to person, place, and time.   Skin: Skin is warm, dry and intact. Capillary refill takes less than 2 seconds.   Psychiatric: He has a normal mood and affect. His speech is normal and behavior is normal. Judgment normal.         Medical Screening Exam   See Full Note    ED Course   Procedures  Labs Reviewed   URINALYSIS, REFLEX TO URINE CULTURE - Abnormal       Result Value    Color, UA Yellow      Clarity, UA Clear      pH, UA 6.0      Leukocytes, UA Negative      Nitrites, UA  Negative      Protein, UA 30 (*)     Glucose, UA Negative      Ketones, UA Negative      Urobilinogen, UA 0.2      Bilirubin, UA Negative      Blood, UA Moderate (*)     Specific Gravity, UA 1.020     COMPREHENSIVE METABOLIC PANEL - Abnormal    Sodium 141      Potassium 4.2      Chloride 106      CO2 24      Anion Gap 15      Glucose 113 (*)     BUN 14      Creatinine 0.91      BUN/Creatinine Ratio 15      Calcium 9.8      Total Protein 7.8      Albumin 4.2      Globulin 3.6      A/G Ratio 1.2      Bilirubin, Total 0.6      Alk Phos        ALT 22      AST 39      eGFR 106     CBC WITH DIFFERENTIAL - Abnormal    WBC 8.01      RBC 5.21      Hemoglobin 16.9      Hematocrit 50.4      MCV 96.7 (*)     MCH 32.4 (*)     MCHC 33.5      RDW 12.7      Platelet Count 258      MPV 9.9      Neutrophils % 53.6      Lymphocytes % 33.5      Neutrophils, Abs 4.29      Lymphocytes, Absolute 2.68      Diff Type Auto      Monocytes % 8.1 (*)     Eosinophils % 4.2 (*)     Basophils % 0.6      Monocytes, Absolute 0.65      Eosinophils, Absolute 0.34      Basophils, Absolute 0.05     URINALYSIS, MICROSCOPIC - Abnormal    WBC, UA 0-5      RBC, UA Too Numerous To Count (*)     Bacteria, UA Rare      Squamous Epithelial Cells, UA Rare      Mucus, UA Few (*)    CBC W/ AUTO DIFFERENTIAL    Narrative:     The following orders were created for panel order CBC Auto Differential.  Procedure                               Abnormality         Status                     ---------                               -----------         ------                     CBC with Differential[[id number]]       Abnormal            Final result                 Please view results for these tests on the individual orders.          Imaging Results              CT Abdomen Pelvis  Without Contrast (In process)                      Medications   ketorolac injection 15 mg (15 mg Intravenous Given 4/29/25 0555)   sodium chloride 0.9% bolus 1,000 mL 1,000 mL (0 mLs Intravenous  Stopped 4/29/25 0657)     Medical Decision Making  45 y/o male with PMHx of HTN, GERD and Kidney stones arrived to the ED via POV with complaint of dysuria, right flank pain that radiates to lower abdomen and into right testicle that started on Friday. Describes pain as dull and aching; Pain is intermittent, but worse today. Rates pain 6/10. Denies fever, nausea or vomiting. Took Norco at 02:30 this am with some relief. Patient was seen in clinic yesterday for same symptoms. Had UA that revealed small blood and occasional bacteria. He was given a Rocephin 1 gm in clinic and awaiting CT to be scheduled.     Initial consideration in this patient included ureteral/kidney stone, urinary tract infection & pyelonephritis.   Patient presented with rightflank pain with report of prio episodes of kidney stones about 2 years ago that did not require surgical intervention.  Patient was noted to have hematuria on urinalysis without findings suggestive of UTI.  Patient without fever or other systemic infectious symptoms. Bedside ultrasound not available therefore CT of abd/pelvis was obtained  with evidence of 3 mm calculus at the right UVJ, and with mild hydrouteronephrosis.  Based on stone size [1-4 mm (78% passage rate)it was felt that the stone had a high likelihood of passing without intervention/a consult was placed for Urology follow up with Dr. Borden.  Labs were obtained and [noted to be unremarkable/notable for specific abnormalities].  Pain improved  with Ketorolac and 1L NS bolus in the ED.     Problems Addressed:  Calculus of ureter: acute illness or injury     Details: Referral placed for Urology follow up, and discussed with patient prior to discharge.   Discussed return precautions, specifically for evidence of infected stone or worsening pain, treatment with analgesics and Tamsulosin (Flomax) and follow up with primary care doctor as needed and Urology, and the patient demonstrated understanding and  agreement.    Amount and/or Complexity of Data Reviewed  Labs: ordered.  Radiology: ordered.    Risk  Prescription drug management.  Risk Details: Low risk                                      Clinical Impression:   Final diagnoses:  [R10.9] Flank pain  [N20.1] Calculus of ureter (Primary)        ED Disposition Condition    Discharge Stable          ED Prescriptions       Medication Sig Dispense Start Date End Date Auth. Provider    tamsulosin (FLOMAX) 0.4 mg Cap Take 1 capsule (0.4 mg total) by mouth once daily. 10 capsule 4/29/2025 4/29/2026 Katy Mariee FNP    ondansetron (ZOFRAN) 4 MG tablet Take 1 tablet (4 mg total) by mouth every 6 (six) hours as needed. 12 tablet 4/29/2025 -- Katy Mariee FNP    ketorolac (TORADOL) 10 mg tablet Take 1 tablet (10 mg total) by mouth every 6 (six) hours as needed for Pain. 12 tablet 4/29/2025 -- Katy Mariee FNP          Follow-up Information       Follow up With Specialties Details Why Contact Info    [facility] - Emergency Department Emergency Medicine  If symptoms worsen [id number]  [facility] [id number] [phone]                 [1]   Social History  Tobacco Use    Smoking status: Every Day     Types: Cigarettes    Smokeless tobacco: Current     Types: Snuff   Substance Use Topics    Alcohol use: Not Currently    Drug use: Never        Katy Mariee FNP  04/29/25 0866

## 2025-04-29 NOTE — ED NOTES
DC INSTRUCTIONS GIVEN TO PT. IV REMOVED. CALLED TO GET UROLOGY APPT WITH [facility] AND 1ST AVAIL WAS MARCH OF 2026. WAS TOLD WOULD SEND A MESSAGE TO STAFF TO SEE IF COULD GET PT IN SOONER AND WOULD CALL PT TO UPDATE. PT LEFT ER WITH THIS INFO.